# Patient Record
Sex: MALE | Race: WHITE | NOT HISPANIC OR LATINO
[De-identification: names, ages, dates, MRNs, and addresses within clinical notes are randomized per-mention and may not be internally consistent; named-entity substitution may affect disease eponyms.]

---

## 2017-06-30 ENCOUNTER — APPOINTMENT (OUTPATIENT)
Dept: UROLOGY | Facility: CLINIC | Age: 82
End: 2017-06-30

## 2017-06-30 VITALS
HEIGHT: 71 IN | HEART RATE: 62 BPM | SYSTOLIC BLOOD PRESSURE: 177 MMHG | WEIGHT: 200 LBS | BODY MASS INDEX: 28 KG/M2 | TEMPERATURE: 97.6 F | OXYGEN SATURATION: 97 % | DIASTOLIC BLOOD PRESSURE: 81 MMHG

## 2017-06-30 DIAGNOSIS — Z86.69 PERSONAL HISTORY OF OTHER DISEASES OF THE NERVOUS SYSTEM AND SENSE ORGANS: ICD-10-CM

## 2017-06-30 DIAGNOSIS — Z85.820 PERSONAL HISTORY OF MALIGNANT MELANOMA OF SKIN: ICD-10-CM

## 2017-06-30 RX ORDER — CIPROFLOXACIN HYDROCHLORIDE 500 MG/1
500 TABLET, FILM COATED ORAL TWICE DAILY
Qty: 20 | Refills: 0 | Status: ACTIVE | COMMUNITY
Start: 2017-06-30 | End: 1900-01-01

## 2017-07-01 PROBLEM — Z86.69 HISTORY OF MACULAR DEGENERATION: Status: RESOLVED | Noted: 2017-07-01 | Resolved: 2017-07-01

## 2017-07-01 PROBLEM — Z85.820 HISTORY OF MALIGNANT MELANOMA OF SKIN: Status: RESOLVED | Noted: 2017-07-01 | Resolved: 2017-07-01

## 2017-07-01 LAB
ANION GAP SERPL CALC-SCNC: 21 MMOL/L
APPEARANCE: ABNORMAL
BACTERIA: NEGATIVE
BILIRUBIN URINE: ABNORMAL
BLOOD URINE: ABNORMAL
BUN SERPL-MCNC: 19 MG/DL
CALCIUM SERPL-MCNC: 9.6 MG/DL
CHLORIDE SERPL-SCNC: 97 MMOL/L
CO2 SERPL-SCNC: 20 MMOL/L
COLOR: ABNORMAL
CREAT SERPL-MCNC: 1.43 MG/DL
GLUCOSE QUALITATIVE U: NORMAL
GLUCOSE SERPL-MCNC: 85 MG/DL
HYALINE CASTS: 6 /LPF
KETONES URINE: NEGATIVE
LEUKOCYTE ESTERASE URINE: ABNORMAL
MICROSCOPIC-UA: NORMAL
NITRITE URINE: POSITIVE
PH URINE: 6
POTASSIUM SERPL-SCNC: 4.3 MMOL/L
PROTEIN URINE: 100
PSA SERPL-MCNC: 2.32 NG/ML
RED BLOOD CELLS URINE: >720 /HPF
SODIUM SERPL-SCNC: 138 MMOL/L
SPECIFIC GRAVITY URINE: 1.02
SQUAMOUS EPITHELIAL CELLS: 0 /HPF
UROBILINOGEN URINE: NORMAL
WHITE BLOOD CELLS URINE: 44 /HPF

## 2017-07-03 ENCOUNTER — OUTPATIENT (OUTPATIENT)
Dept: OUTPATIENT SERVICES | Facility: HOSPITAL | Age: 82
LOS: 1 days | End: 2017-07-03
Payer: MEDICARE

## 2017-07-03 ENCOUNTER — APPOINTMENT (OUTPATIENT)
Dept: CT IMAGING | Facility: CLINIC | Age: 82
End: 2017-07-03

## 2017-07-03 DIAGNOSIS — Z00.8 ENCOUNTER FOR OTHER GENERAL EXAMINATION: ICD-10-CM

## 2017-07-03 PROCEDURE — 74178 CT ABD&PLV WO CNTR FLWD CNTR: CPT

## 2017-07-03 PROCEDURE — 82565 ASSAY OF CREATININE: CPT

## 2017-07-08 LAB
BACTERIA UR CULT: ABNORMAL
CORE LAB FLUID CYTOLOGY: NORMAL

## 2017-07-10 ENCOUNTER — APPOINTMENT (OUTPATIENT)
Dept: UROLOGY | Facility: CLINIC | Age: 82
End: 2017-07-10

## 2017-07-10 VITALS
WEIGHT: 197 LBS | TEMPERATURE: 98.4 F | HEIGHT: 71 IN | BODY MASS INDEX: 27.58 KG/M2 | SYSTOLIC BLOOD PRESSURE: 177 MMHG | RESPIRATION RATE: 17 BRPM | HEART RATE: 65 BPM | DIASTOLIC BLOOD PRESSURE: 67 MMHG

## 2017-07-10 DIAGNOSIS — R31.9 HEMATURIA, UNSPECIFIED: ICD-10-CM

## 2017-07-10 DIAGNOSIS — Z00.00 ENCOUNTER FOR GENERAL ADULT MEDICAL EXAMINATION W/OUT ABNORMAL FINDINGS: ICD-10-CM

## 2017-07-10 DIAGNOSIS — R93.421 ABNORMAL RADIOLOGIC FINDINGS ON DIAGNOSITIC IMAGING OF RIGHT KIDNEY: ICD-10-CM

## 2017-07-10 DIAGNOSIS — R93.41 ABNORMAL RADIOLOGIC FINDINGS ON DIAGNOSTIC IMAGING OF OF RENAL PELVIS, URETER, OR BLADDER: ICD-10-CM

## 2017-07-11 PROBLEM — R31.9 HEMATURIA: Status: ACTIVE | Noted: 2017-06-30

## 2017-07-11 PROBLEM — Z00.00 ENCOUNTER FOR PREVENTIVE HEALTH EXAMINATION: Status: ACTIVE | Noted: 2017-06-29

## 2017-07-11 PROBLEM — R93.421 ABNORMAL FINDING ON DIAGNOSTIC IMAGING OF RIGHT KIDNEY: Status: ACTIVE | Noted: 2017-07-11

## 2017-07-11 PROBLEM — R93.41 ABNORMAL FINDING ON DIAGNOSTIC IMAGING OF RENAL PELVIS, URETER, OR BLADDER: Status: ACTIVE | Noted: 2017-07-11

## 2017-07-11 LAB
APTT BLD: 33 SEC
BASOPHILS # BLD AUTO: 0.02 K/UL
BASOPHILS NFR BLD AUTO: 0.3 %
EOSINOPHIL # BLD AUTO: 0.12 K/UL
EOSINOPHIL NFR BLD AUTO: 1.7 %
HCT VFR BLD CALC: 35.9 %
HGB BLD-MCNC: 11.6 G/DL
IMM GRANULOCYTES NFR BLD AUTO: 0.3 %
INR PPP: 1 RATIO
LYMPHOCYTES # BLD AUTO: 0.71 K/UL
LYMPHOCYTES NFR BLD AUTO: 10.1 %
MAN DIFF?: NORMAL
MCHC RBC-ENTMCNC: 30 PG
MCHC RBC-ENTMCNC: 32.3 GM/DL
MCV RBC AUTO: 92.8 FL
MONOCYTES # BLD AUTO: 1.09 K/UL
MONOCYTES NFR BLD AUTO: 15.5 %
NEUTROPHILS # BLD AUTO: 5.07 K/UL
NEUTROPHILS NFR BLD AUTO: 72.1 %
PLATELET # BLD AUTO: 272 K/UL
PT BLD: 11.3 SEC
RBC # BLD: 3.87 M/UL
RBC # FLD: 14.3 %
WBC # FLD AUTO: 7.03 K/UL

## 2017-07-18 ENCOUNTER — FORM ENCOUNTER (OUTPATIENT)
Age: 82
End: 2017-07-18

## 2017-07-19 ENCOUNTER — APPOINTMENT (OUTPATIENT)
Age: 82
End: 2017-07-19

## 2017-07-19 ENCOUNTER — OUTPATIENT (OUTPATIENT)
Dept: OUTPATIENT SERVICES | Facility: HOSPITAL | Age: 82
LOS: 1 days | End: 2017-07-19
Payer: MEDICARE

## 2017-07-19 DIAGNOSIS — R93.421 ABNORMAL RADIOLOGIC FINDINGS ON DIAGNOSTIC IMAGING OF RIGHT KIDNEY: ICD-10-CM

## 2017-07-19 PROCEDURE — A9562: CPT

## 2017-07-19 PROCEDURE — 51702 INSERT TEMP BLADDER CATH: CPT

## 2017-07-19 PROCEDURE — 78707 K FLOW/FUNCT IMAGE W/O DRUG: CPT

## 2017-07-24 ENCOUNTER — RECORD ABSTRACTING (OUTPATIENT)
Age: 82
End: 2017-07-24

## 2017-08-04 RX ORDER — LOSARTAN POTASSIUM 100 MG/1
100 TABLET, FILM COATED ORAL
Refills: 0 | Status: ACTIVE | COMMUNITY

## 2017-08-05 ENCOUNTER — INPATIENT (INPATIENT)
Facility: HOSPITAL | Age: 82
LOS: 2 days | Discharge: HOME | DRG: 243 | End: 2017-08-08
Attending: STUDENT IN AN ORGANIZED HEALTH CARE EDUCATION/TRAINING PROGRAM | Admitting: STUDENT IN AN ORGANIZED HEALTH CARE EDUCATION/TRAINING PROGRAM
Payer: MEDICARE

## 2017-08-05 ENCOUNTER — EMERGENCY (EMERGENCY)
Facility: HOSPITAL | Age: 82
LOS: 1 days | Discharge: ANOTHER HOSPITAL | End: 2017-08-05
Attending: STUDENT IN AN ORGANIZED HEALTH CARE EDUCATION/TRAINING PROGRAM | Admitting: STUDENT IN AN ORGANIZED HEALTH CARE EDUCATION/TRAINING PROGRAM
Payer: MEDICARE

## 2017-08-05 VITALS
HEART RATE: 82 BPM | WEIGHT: 193.79 LBS | HEIGHT: 70 IN | SYSTOLIC BLOOD PRESSURE: 196 MMHG | DIASTOLIC BLOOD PRESSURE: 96 MMHG | RESPIRATION RATE: 20 BRPM | TEMPERATURE: 98 F | OXYGEN SATURATION: 99 %

## 2017-08-05 VITALS
OXYGEN SATURATION: 98 % | SYSTOLIC BLOOD PRESSURE: 182 MMHG | RESPIRATION RATE: 11 BRPM | WEIGHT: 190.04 LBS | DIASTOLIC BLOOD PRESSURE: 92 MMHG | TEMPERATURE: 98 F | HEART RATE: 75 BPM | HEIGHT: 69 IN

## 2017-08-05 VITALS
RESPIRATION RATE: 17 BRPM | OXYGEN SATURATION: 100 % | DIASTOLIC BLOOD PRESSURE: 86 MMHG | HEART RATE: 73 BPM | SYSTOLIC BLOOD PRESSURE: 144 MMHG

## 2017-08-05 DIAGNOSIS — C64.1 MALIGNANT NEOPLASM OF RIGHT KIDNEY, EXCEPT RENAL PELVIS: ICD-10-CM

## 2017-08-05 DIAGNOSIS — I49.9 CARDIAC ARRHYTHMIA, UNSPECIFIED: ICD-10-CM

## 2017-08-05 DIAGNOSIS — R55 SYNCOPE AND COLLAPSE: ICD-10-CM

## 2017-08-05 DIAGNOSIS — I10 ESSENTIAL (PRIMARY) HYPERTENSION: ICD-10-CM

## 2017-08-05 DIAGNOSIS — L98.9 DISORDER OF THE SKIN AND SUBCUTANEOUS TISSUE, UNSPECIFIED: ICD-10-CM

## 2017-08-05 DIAGNOSIS — S82.209A UNSPECIFIED FRACTURE OF SHAFT OF UNSPECIFIED TIBIA, INITIAL ENCOUNTER FOR CLOSED FRACTURE: ICD-10-CM

## 2017-08-05 DIAGNOSIS — Z98.890 OTHER SPECIFIED POSTPROCEDURAL STATES: Chronic | ICD-10-CM

## 2017-08-05 DIAGNOSIS — I46.9 CARDIAC ARREST, CAUSE UNSPECIFIED: ICD-10-CM

## 2017-08-05 LAB
ALBUMIN SERPL ELPH-MCNC: 3.2 G/DL — LOW (ref 3.3–5)
ALBUMIN SERPL ELPH-MCNC: 3.6 G/DL — SIGNIFICANT CHANGE UP (ref 3.3–5)
ALP SERPL-CCNC: 53 U/L — SIGNIFICANT CHANGE UP (ref 40–120)
ALP SERPL-CCNC: 56 U/L — SIGNIFICANT CHANGE UP (ref 30–120)
ALT FLD-CCNC: 9 U/L RC — LOW (ref 10–45)
ALT FLD-CCNC: <10 U/L DA — LOW (ref 10–60)
ANION GAP SERPL CALC-SCNC: 10 MMOL/L — SIGNIFICANT CHANGE UP (ref 5–17)
ANION GAP SERPL CALC-SCNC: 14 MMOL/L — SIGNIFICANT CHANGE UP (ref 5–17)
APPEARANCE UR: ABNORMAL
APTT BLD: 28.3 SEC — SIGNIFICANT CHANGE UP (ref 27.5–37.4)
AST SERPL-CCNC: 14 U/L — SIGNIFICANT CHANGE UP (ref 10–40)
AST SERPL-CCNC: 15 U/L — SIGNIFICANT CHANGE UP (ref 10–40)
BASOPHILS # BLD AUTO: 0.1 K/UL — SIGNIFICANT CHANGE UP (ref 0–0.2)
BASOPHILS # BLD AUTO: 0.1 K/UL — SIGNIFICANT CHANGE UP (ref 0–0.2)
BASOPHILS NFR BLD AUTO: 0.5 % — SIGNIFICANT CHANGE UP (ref 0–2)
BASOPHILS NFR BLD AUTO: 0.6 % — SIGNIFICANT CHANGE UP (ref 0–2)
BILIRUB SERPL-MCNC: 0.8 MG/DL — SIGNIFICANT CHANGE UP (ref 0.2–1.2)
BILIRUB SERPL-MCNC: 0.9 MG/DL — SIGNIFICANT CHANGE UP (ref 0.2–1.2)
BILIRUB UR-MCNC: NEGATIVE — SIGNIFICANT CHANGE UP
BLD GP AB SCN SERPL QL: NEGATIVE — SIGNIFICANT CHANGE UP
BUN SERPL-MCNC: 21 MG/DL — SIGNIFICANT CHANGE UP (ref 7–23)
BUN SERPL-MCNC: 23 MG/DL — SIGNIFICANT CHANGE UP (ref 7–23)
CALCIUM SERPL-MCNC: 8.5 MG/DL — SIGNIFICANT CHANGE UP (ref 8.4–10.5)
CALCIUM SERPL-MCNC: 8.8 MG/DL — SIGNIFICANT CHANGE UP (ref 8.4–10.5)
CHLORIDE SERPL-SCNC: 101 MMOL/L — SIGNIFICANT CHANGE UP (ref 96–108)
CHLORIDE SERPL-SCNC: 102 MMOL/L — SIGNIFICANT CHANGE UP (ref 96–108)
CHOLEST SERPL-MCNC: 150 MG/DL — SIGNIFICANT CHANGE UP (ref 10–199)
CK MB BLD-MCNC: 1.7 % — SIGNIFICANT CHANGE UP (ref 0–3.5)
CK MB BLD-MCNC: 3.2 % — SIGNIFICANT CHANGE UP (ref 0–3.5)
CK MB CFR SERPL CALC: 1.7 NG/ML — SIGNIFICANT CHANGE UP (ref 0–3.6)
CK MB CFR SERPL CALC: 2.9 NG/ML — SIGNIFICANT CHANGE UP (ref 0–6.7)
CK SERPL-CCNC: 101 U/L — SIGNIFICANT CHANGE UP (ref 39–308)
CK SERPL-CCNC: 90 U/L — SIGNIFICANT CHANGE UP (ref 30–200)
CO2 SERPL-SCNC: 23 MMOL/L — SIGNIFICANT CHANGE UP (ref 22–31)
CO2 SERPL-SCNC: 26 MMOL/L — SIGNIFICANT CHANGE UP (ref 22–31)
COLOR SPEC: ABNORMAL
CREAT SERPL-MCNC: 1.6 MG/DL — HIGH (ref 0.5–1.3)
CREAT SERPL-MCNC: 1.88 MG/DL — HIGH (ref 0.5–1.3)
DIFF PNL FLD: ABNORMAL
EOSINOPHIL # BLD AUTO: 0 K/UL — SIGNIFICANT CHANGE UP (ref 0–0.5)
EOSINOPHIL # BLD AUTO: 0 K/UL — SIGNIFICANT CHANGE UP (ref 0–0.5)
EOSINOPHIL NFR BLD AUTO: 0.2 % — SIGNIFICANT CHANGE UP (ref 0–6)
EOSINOPHIL NFR BLD AUTO: 0.5 % — SIGNIFICANT CHANGE UP (ref 0–6)
GAS PNL BLDV: SIGNIFICANT CHANGE UP
GLUCOSE SERPL-MCNC: 103 MG/DL — HIGH (ref 70–99)
GLUCOSE SERPL-MCNC: 123 MG/DL — HIGH (ref 70–99)
GLUCOSE UR QL: NEGATIVE MG/DL — SIGNIFICANT CHANGE UP
HBA1C BLD-MCNC: 5.3 % — SIGNIFICANT CHANGE UP (ref 4–5.6)
HCT VFR BLD CALC: 37 % — LOW (ref 39–50)
HCT VFR BLD CALC: 37.5 % — LOW (ref 39–50)
HDLC SERPL-MCNC: 55 MG/DL — SIGNIFICANT CHANGE UP (ref 40–125)
HGB BLD-MCNC: 11.7 G/DL — LOW (ref 13–17)
HGB BLD-MCNC: 11.8 G/DL — LOW (ref 13–17)
INR BLD: 1.08 RATIO — SIGNIFICANT CHANGE UP (ref 0.88–1.16)
KETONES UR-MCNC: ABNORMAL
LEUKOCYTE ESTERASE UR-ACNC: ABNORMAL
LIPID PNL WITH DIRECT LDL SERPL: 83 MG/DL — SIGNIFICANT CHANGE UP
LYMPHOCYTES # BLD AUTO: 0.6 K/UL — LOW (ref 1–3.3)
LYMPHOCYTES # BLD AUTO: 0.7 K/UL — LOW (ref 1–3.3)
LYMPHOCYTES # BLD AUTO: 6.1 % — LOW (ref 13–44)
LYMPHOCYTES # BLD AUTO: 6.8 % — LOW (ref 13–44)
MAGNESIUM SERPL-MCNC: 2.2 MG/DL — SIGNIFICANT CHANGE UP (ref 1.6–2.6)
MCHC RBC-ENTMCNC: 30.7 PG — SIGNIFICANT CHANGE UP (ref 27–34)
MCHC RBC-ENTMCNC: 31.1 PG — SIGNIFICANT CHANGE UP (ref 27–34)
MCHC RBC-ENTMCNC: 31.5 GM/DL — LOW (ref 32–36)
MCHC RBC-ENTMCNC: 31.7 GM/DL — LOW (ref 32–36)
MCV RBC AUTO: 96.7 FL — SIGNIFICANT CHANGE UP (ref 80–100)
MCV RBC AUTO: 98.7 FL — SIGNIFICANT CHANGE UP (ref 80–100)
MONOCYTES # BLD AUTO: 1.1 K/UL — HIGH (ref 0–0.9)
MONOCYTES # BLD AUTO: 1.1 K/UL — HIGH (ref 0–0.9)
MONOCYTES NFR BLD AUTO: 10.8 % — SIGNIFICANT CHANGE UP (ref 2–14)
MONOCYTES NFR BLD AUTO: 11.1 % — SIGNIFICANT CHANGE UP (ref 2–14)
NEUTROPHILS # BLD AUTO: 8 K/UL — HIGH (ref 1.8–7.4)
NEUTROPHILS # BLD AUTO: 8.2 K/UL — HIGH (ref 1.8–7.4)
NEUTROPHILS NFR BLD AUTO: 81.3 % — HIGH (ref 43–77)
NEUTROPHILS NFR BLD AUTO: 82 % — HIGH (ref 43–77)
NITRITE UR-MCNC: NEGATIVE — SIGNIFICANT CHANGE UP
PH UR: 7 — SIGNIFICANT CHANGE UP (ref 5–8)
PLATELET # BLD AUTO: 136 K/UL — LOW (ref 150–400)
PLATELET # BLD AUTO: 137 K/UL — LOW (ref 150–400)
POTASSIUM SERPL-MCNC: 3.9 MMOL/L — SIGNIFICANT CHANGE UP (ref 3.5–5.3)
POTASSIUM SERPL-MCNC: 4.1 MMOL/L — SIGNIFICANT CHANGE UP (ref 3.5–5.3)
POTASSIUM SERPL-SCNC: 3.9 MMOL/L — SIGNIFICANT CHANGE UP (ref 3.5–5.3)
POTASSIUM SERPL-SCNC: 4.1 MMOL/L — SIGNIFICANT CHANGE UP (ref 3.5–5.3)
PROT SERPL-MCNC: 6.3 G/DL — SIGNIFICANT CHANGE UP (ref 6–8.3)
PROT SERPL-MCNC: 6.4 G/DL — SIGNIFICANT CHANGE UP (ref 6–8.3)
PROT UR-MCNC: 100 MG/DL
PROTHROM AB SERPL-ACNC: 11.7 SEC — SIGNIFICANT CHANGE UP (ref 9.8–12.7)
RBC # BLD: 3.8 M/UL — LOW (ref 4.2–5.8)
RBC # BLD: 3.83 M/UL — LOW (ref 4.2–5.8)
RBC # FLD: 13.4 % — SIGNIFICANT CHANGE UP (ref 10.3–14.5)
RBC # FLD: 13.8 % — SIGNIFICANT CHANGE UP (ref 10.3–14.5)
RBC CASTS # UR COMP ASSIST: >50 /HPF (ref 0–4)
RH IG SCN BLD-IMP: POSITIVE — SIGNIFICANT CHANGE UP
SODIUM SERPL-SCNC: 138 MMOL/L — SIGNIFICANT CHANGE UP (ref 135–145)
SODIUM SERPL-SCNC: 138 MMOL/L — SIGNIFICANT CHANGE UP (ref 135–145)
SP GR SPEC: 1.01 — SIGNIFICANT CHANGE UP (ref 1.01–1.02)
TOTAL CHOLESTEROL/HDL RATIO MEASUREMENT: 2.7 RATIO — LOW (ref 3.4–9.6)
TRIGL SERPL-MCNC: 60 MG/DL — SIGNIFICANT CHANGE UP (ref 10–149)
TROPONIN I SERPL-MCNC: 0.01 NG/ML — LOW (ref 0.02–0.06)
TROPONIN T SERPL-MCNC: <0.01 NG/ML — SIGNIFICANT CHANGE UP (ref 0–0.06)
TSH SERPL-MCNC: 1.39 UIU/ML — SIGNIFICANT CHANGE UP (ref 0.27–4.2)
UROBILINOGEN FLD QL: NEGATIVE MG/DL — SIGNIFICANT CHANGE UP
WBC # BLD: 10.1 K/UL — SIGNIFICANT CHANGE UP (ref 3.8–10.5)
WBC # BLD: 9.8 K/UL — SIGNIFICANT CHANGE UP (ref 3.8–10.5)
WBC # FLD AUTO: 10.1 K/UL — SIGNIFICANT CHANGE UP (ref 3.8–10.5)
WBC # FLD AUTO: 9.8 K/UL — SIGNIFICANT CHANGE UP (ref 3.8–10.5)

## 2017-08-05 PROCEDURE — 73590 X-RAY EXAM OF LOWER LEG: CPT | Mod: 26,LT

## 2017-08-05 PROCEDURE — 85027 COMPLETE CBC AUTOMATED: CPT

## 2017-08-05 PROCEDURE — 70450 CT HEAD/BRAIN W/O DYE: CPT

## 2017-08-05 PROCEDURE — 96375 TX/PRO/DX INJ NEW DRUG ADDON: CPT

## 2017-08-05 PROCEDURE — 27786 TREATMENT OF ANKLE FRACTURE: CPT | Mod: LT

## 2017-08-05 PROCEDURE — 93010 ELECTROCARDIOGRAM REPORT: CPT

## 2017-08-05 PROCEDURE — 99291 CRITICAL CARE FIRST HOUR: CPT

## 2017-08-05 PROCEDURE — 80053 COMPREHEN METABOLIC PANEL: CPT

## 2017-08-05 PROCEDURE — 81001 URINALYSIS AUTO W/SCOPE: CPT

## 2017-08-05 PROCEDURE — 84484 ASSAY OF TROPONIN QUANT: CPT

## 2017-08-05 PROCEDURE — 99291 CRITICAL CARE FIRST HOUR: CPT | Mod: 25

## 2017-08-05 PROCEDURE — 99223 1ST HOSP IP/OBS HIGH 75: CPT | Mod: GC

## 2017-08-05 PROCEDURE — 82553 CREATINE MB FRACTION: CPT

## 2017-08-05 PROCEDURE — 71010: CPT | Mod: 26,77

## 2017-08-05 PROCEDURE — 70450 CT HEAD/BRAIN W/O DYE: CPT | Mod: 26

## 2017-08-05 PROCEDURE — 73610 X-RAY EXAM OF ANKLE: CPT | Mod: 26,77,LT

## 2017-08-05 PROCEDURE — 96376 TX/PRO/DX INJ SAME DRUG ADON: CPT

## 2017-08-05 PROCEDURE — 73610 X-RAY EXAM OF ANKLE: CPT

## 2017-08-05 PROCEDURE — 73610 X-RAY EXAM OF ANKLE: CPT | Mod: 26,LT

## 2017-08-05 PROCEDURE — 93306 TTE W/DOPPLER COMPLETE: CPT | Mod: 26

## 2017-08-05 PROCEDURE — 71010: CPT | Mod: 26,76

## 2017-08-05 PROCEDURE — 82550 ASSAY OF CK (CPK): CPT

## 2017-08-05 PROCEDURE — 99233 SBSQ HOSP IP/OBS HIGH 50: CPT

## 2017-08-05 PROCEDURE — 71045 X-RAY EXAM CHEST 1 VIEW: CPT

## 2017-08-05 PROCEDURE — 96374 THER/PROPH/DIAG INJ IV PUSH: CPT

## 2017-08-05 RX ORDER — SODIUM CHLORIDE 9 MG/ML
3 INJECTION INTRAMUSCULAR; INTRAVENOUS; SUBCUTANEOUS ONCE
Qty: 0 | Refills: 0 | Status: COMPLETED | OUTPATIENT
Start: 2017-08-05 | End: 2017-08-05

## 2017-08-05 RX ORDER — LOSARTAN POTASSIUM 100 MG/1
100 TABLET, FILM COATED ORAL DAILY
Qty: 0 | Refills: 0 | Status: DISCONTINUED | OUTPATIENT
Start: 2017-08-05 | End: 2017-08-08

## 2017-08-05 RX ORDER — MUPIROCIN 20 MG/G
1 OINTMENT TOPICAL DAILY
Qty: 0 | Refills: 0 | Status: DISCONTINUED | OUTPATIENT
Start: 2017-08-05 | End: 2017-08-08

## 2017-08-05 RX ORDER — LIDOCAINE HCL 20 MG/ML
100 VIAL (ML) INJECTION ONCE
Qty: 0 | Refills: 0 | Status: DISCONTINUED | OUTPATIENT
Start: 2017-08-05 | End: 2017-08-05

## 2017-08-05 RX ORDER — ACETAMINOPHEN 500 MG
650 TABLET ORAL ONCE
Qty: 0 | Refills: 0 | Status: COMPLETED | OUTPATIENT
Start: 2017-08-05 | End: 2017-08-05

## 2017-08-05 RX ORDER — HEPARIN SODIUM 5000 [USP'U]/ML
5000 INJECTION INTRAVENOUS; SUBCUTANEOUS EVERY 12 HOURS
Qty: 0 | Refills: 0 | Status: DISCONTINUED | OUTPATIENT
Start: 2017-08-05 | End: 2017-08-08

## 2017-08-05 RX ADMIN — LOSARTAN POTASSIUM 100 MILLIGRAM(S): 100 TABLET, FILM COATED ORAL at 13:15

## 2017-08-05 RX ADMIN — SODIUM CHLORIDE 3 MILLILITER(S): 9 INJECTION INTRAMUSCULAR; INTRAVENOUS; SUBCUTANEOUS at 07:18

## 2017-08-05 RX ADMIN — HEPARIN SODIUM 5000 UNIT(S): 5000 INJECTION INTRAVENOUS; SUBCUTANEOUS at 18:07

## 2017-08-05 RX ADMIN — MUPIROCIN 1 APPLICATION(S): 20 OINTMENT TOPICAL at 15:52

## 2017-08-05 NOTE — ED ADULT NURSE REASSESSMENT NOTE - NS ED NURSE REASSESS COMMENT FT1
0830-voiding blood tinged urine in urinal without difficulty    0900- Dr Turner-cardiology - in attendance  0915- Report given to [facility]- Candis MANRIQUEZ

## 2017-08-05 NOTE — ED PROVIDER NOTE - HEAD, MLM
Head is atraumatic. Head shape is symmetrical. Ecchymosis to right eyelid, no laceration or bleeding

## 2017-08-05 NOTE — PROGRESS NOTE ADULT - SUBJECTIVE AND OBJECTIVE BOX
Date of Admission:8/5/17    24H hour events: admitted with heart block. temporary wire placed without incident.       MEDICATIONS:  losartan 100 milliGRAM(s) Oral daily              mupirocin 2% Ointment 1 Application(s) Topical daily      REVIEW OF SYSTEMS:  General: no fatigue/malaise, weight loss/gain.  Skin: no rashes.  Ophthalmologic: no blurred vision, no loss of vision. 	  ENT: no sore throat, rhinorrhea, sinus congestion.  Respiratory: no SOB, cough or wheeze.  Gastrointestinal:  no N/V/D, no melena/hematemesis/hematochezia.  Genitourinary: no dysuria/hesitancy or hematuria.  Musculoskeletal: no myalgias or arthralgias.  Neurological: no changes in vision or hearing, no lightheadedness/dizziness, no syncope/near syncope	  Psychiatric: no unusual stress/anxiety.   Hematology/Lymphatics: no unusual bleeding, bruising and no lymphadenopathy.  Endocrine: no unusual thirst.   All others negative except as stated above and in HPI.    PHYSICAL EXAM:  T(C): 36.9 (08-05-17 @ 10:46), Max: 36.9 (08-05-17 @ 07:40)  HR: 74 (08-05-17 @ 12:46) (73 - 90)  BP: 174/100 (08-05-17 @ 12:46) (121/80 - 196/96)  RR: 19 (08-05-17 @ 12:46) (11 - 20)  SpO2: 97% (08-05-17 @ 12:46) (97% - 100%)  Wt(kg): --  I&O's Summary    05 Aug 2017 07:01  -  05 Aug 2017 14:37  --------------------------------------------------------  IN: 0 mL / OUT: 175 mL / NET: -175 mL        Appearance: Normal	  HEENT:   Normal oral mucosa, PERRL, EOMI	  Lymphatic: No lymphadenopathy  Cardiovascular: Normal S1 S2, No JVD, No murmurs, No edema  Respiratory: Lungs clear to auscultation	  Psychiatry: A & O x 3, Mood & affect appropriate  Gastrointestinal:  Soft, Non-tender, + BS	  Skin: No rashes, No ecchymoses, No cyanosis	  Neurologic: Non-focal  Extremities: Normal range of motion, No clubbing, cyanosis or edema  Vascular: Peripheral pulses palpable 2+ bilaterally        LABS:	 	    CBC Full  -  ( 05 Aug 2017 12:13 )  WBC Count : 10.1 K/uL  Hemoglobin : 11.7 g/dL  Hematocrit : 37.0 %  Platelet Count - Automated : 137 K/uL  Mean Cell Volume : 96.7 fl  Mean Cell Hemoglobin : 30.7 pg  Mean Cell Hemoglobin Concentration : 31.7 gm/dL  Auto Neutrophil # : 8.2 K/uL  Auto Lymphocyte # : 0.7 K/uL  Auto Monocyte # : 1.1 K/uL  Auto Eosinophil # : 0.0 K/uL  Auto Basophil # : 0.1 K/uL  Auto Neutrophil % : 81.3 %  Auto Lymphocyte % : 6.8 %  Auto Monocyte % : 11.1 %  Auto Eosinophil % : 0.2 %  Auto Basophil % : 0.5 %    08-05    138  |  101  |  21  ----------------------------<  103<H>  3.9   |  23  |  1.60<H>  08-05    138  |  102  |  23  ----------------------------<  123<H>  4.1   |  26  |  1.88<H>    Ca    8.8      05 Aug 2017 12:13  Ca    8.5      05 Aug 2017 07:08  Mg     2.2     08-05    TPro  6.3  /  Alb  3.6  /  TBili  0.8  /  DBili  x   /  AST  14  /  ALT  9<L>  /  AlkPhos  53  08-05  TPro  6.4  /  Alb  3.2<L>  /  TBili  0.9  /  DBili  x   /  AST  15  /  ALT  <10<L>  /  AlkPhos  56  08-05      proBNP:   Lipid Profile:   HgA1c:   TSH:       CARDIAC MARKERS:  Troponin I, Serum: .011 ng/mL (08-05 @ 07:08)      PREVIOUS DIAGNOSTIC TESTING:    [ ] Echocardiogram:  [ ]  Catheterization:  [ ] Stress Test:  	  	  ASSESSMENT/PLAN: 	  87 y/o male history of HTN, Cataract, Glaucoma, melanoma dx >20 years ago s/p multiple resections, Renal CA of unknown origin w/ hematuria. Transfer from [facility] for ventricular asystole x2 lasting 8-10 seconds and syncope. temporary wire placed.     given syncope and high grade AV block recommend pacemaker implantation. explained to family and patient that he would have to be full code for procedure. will obtain echo to assess EF. hold AV anabell agents.

## 2017-08-05 NOTE — CONSULT NOTE ADULT - SUBJECTIVE AND OBJECTIVE BOX
History of Present Illness: The patient is an 88 year old male with a history of HTN, renal cell carcinoma, melanoma who presents with syncope. History obtained from daughter. The patient 2 days ago was found after a fall by family members; regained consciousness shortly thereafter. That evening had another fall. No other complaints during the day. No palpitations, dizziness, chest pain. The following night, had another fall. Family brought patient to ED. While in ED, he had three episodes of syncope, all while patient was lying down (one which he was lying down getting a CT). Pauses captured during one episode.    Past Medical/Surgical History:  HTN, renal cell carcinoma, melanoma    Medications:  Losartan    Family History: Non-contributory family history of premature cardiovascular atherosclerotic disease    Social History: No tobacco, alcohol or drug use    Review of Systems:  General: No fevers, chills, weight loss or gain  Skin: No rashes, color changes  Cardiovascular: No chest pain, orthopnea  Respiratory: No shortness of breath, cough  Gastrointestinal: No nausea, abdominal pain  Genitourinary: No incontinence, pain with urination  Musculoskeletal: No pain, swelling, decreased range of motion  Neurological: No headache, weakness  Psychiatric: No depression, anxiety  Endocrine: No weight loss or gain, increased thirst  All other systems are comprehensively negative.    Physical Exam:  Vitals:        Vital Signs Last 24 Hrs  T(C): 36.9 (05 Aug 2017 07:40), Max: 36.9 (05 Aug 2017 07:40)  T(F): 98.5 (05 Aug 2017 07:40), Max: 98.5 (05 Aug 2017 07:40)  HR: 74 (05 Aug 2017 08:38) (74 - 80)  BP: 121/80 (05 Aug 2017 08:38) (121/80 - 188/91)  BP(mean): --  RR: 19 (05 Aug 2017 08:38) (11 - 19)  SpO2: 100% (05 Aug 2017 08:38) (98% - 100%)  General: NAD  Neck: No JVD, no carotid bruit  Lungs: CTAB  CV: RRR, nl S1/S2, no M/R/G  Abdomen: S/NT/ND, +BS  Extremities: No LE edema, no cyanosis    Labs:                        11.8   9.8   )-----------( 136      ( 05 Aug 2017 07:08 )             37.5     08-05    138  |  102  |  23  ----------------------------<  123<H>  4.1   |  26  |  1.88<H>    Ca    8.5      05 Aug 2017 07:08    TPro  6.4  /  Alb  3.2<L>  /  TBili  0.9  /  DBili  x   /  AST  15  /  ALT  <10<L>  /  AlkPhos  56  08-05    CARDIAC MARKERS ( 05 Aug 2017 07:08 )  .011 ng/mL / x     / 101 U/L / x     / 1.7 ng/mL          ECG: NSR, LAFB, 1st deg AVB, PAC

## 2017-08-05 NOTE — H&P ADULT - NSHPPHYSICALEXAM_GEN_ALL_CORE
Gen: awake, alert  HENT: neck soft / supple  Lymph: no LAD noted in neck  Eye: PERRL, sclerae anicteric  CV: normal rate, regular rhythm  Pulm: CTAB, no w/r/r auscultated  Abd: +BS, soft, NT, ND  Skin: warm, dry, +fresh resection of ?lesion on right arm and right thigh. wound is dry w/ clean boarders, no erythema.   Ext: no LE edema  Neuro: answering questions appropriately, following commands appropriately, recent and remote memory intact  Psych: normal mood / affect Gen: awake, alert  HENT: neck soft / supple  Lymph: no LAD noted in neck  Eye: PERRL, sclerae anicteric  CV: normal rate, regular rhythm +systolic murmur  Pulm: CTAB, no w/r/r auscultated  Abd: +BS, soft, NT, ND  Skin: warm, dry, +fresh resection of ?lesion on right arm and right thigh. wound is dry w/ clean boarders, no erythema.   Ext: no LE edema  Neuro: answering questions appropriately, following commands appropriately, recent and remote memory intact  Psych: normal mood / affect Gen: awake, alert  HENT: neck soft / supple, ecchymosis and swelling of right upper eyelid.   Lymph: no LAD noted in neck  Eye: PERRL, sclerae anicteric  CV: normal rate, regular rhythm +systolic murmur  Pulm: CTAB, no w/r/r auscultated  Abd: +BS, soft, NT, ND  Skin: warm, dry, +fresh resection of ?lesion on right arm and right thigh. wound is dry w/ clean boarders, no erythema.   Ext: no LE edema  Neuro: answering questions appropriately, following commands appropriately, recent and remote memory intact  Psych: normal mood / affect

## 2017-08-05 NOTE — ED PROVIDER NOTE - MUSCULOSKELETAL, MLM
Spine appears normal, range of motion is not limited, left ankle swelling, diffuse ecchymosis to dorsum of left foot. Full ROM of left ankle, warm, cap refill< 2 sec, +DP pulse

## 2017-08-05 NOTE — H&P ADULT - PROBLEM SELECTOR PLAN 2
-can resume home losartan 100mg OD  -monitor Cr and BUN q24   -monitor lytes, replenish PRN  -BP check per CCU routine. likely in setting of ventricular asystole  -CT head at [place] negative for acute hemorrhage or infarct

## 2017-08-05 NOTE — CONSULT NOTE ADULT - ASSESSMENT
The patient is an 88 year old male with a history of HTN, renal cell carcinoma, melanoma who presents with multiple episodes of syncope, correlating to paroxysmal episodes of complete heart block.    Plan:  - Transcutaneous pacing placed and capturing  - Avoid AV anabell blocking agents  - Plan for transfer to [facility] for EP evaluation and pacemaker placement

## 2017-08-05 NOTE — H&P ADULT - HISTORY OF PRESENT ILLNESS
89 y/o male history of HTN, Cataract, Glaucoma, melanoma dx >20 years ago s/p multiple resections, Renal CA of unknown origin w/ hematuria. Transfer from [facility] for ventricular asystole x2 lasting 8-10 seconds each. On thursday AM patient had 1 unwitnessed syncopal episode. He states that at the time he felt light headed and passed out, no reports of vertigo, loss of bowel or bladder function, or post ictal state, also denies any numbness/tingling, slurred speech or weakness. Pt did not seek medical attention at that time. In the PM of the same day patient once again had a similar syncopal episode falling and hurting his ankle. 3rd episode of syncope occured this AM in which daughter convinced him to seek medical attention. As per the daughter, the patient did not seem to be feeling well over the past few days. States he has had decrease in appetite, and seemed to not be himself in-terms of social interactions.  Denies chest pain, SOB, nausea, vomiting, headaches, 87 y/o male history of HTN, Cataract, Glaucoma, melanoma dx >20 years ago s/p multiple resections, Renal CA of unknown origin w/ hematuria. Transfer from [facility] for ventricular asystole x2 lasting 8-10 seconds each during radiographic imaging. On thursday AM patient had 1 unwitnessed syncopal episode. He states that at the time he felt light headed and passed out, no reports of vertigo, loss of bowel or bladder function, or post ictal state, also denies any numbness/tingling, slurred speech or weakness. Pt did not seek medical attention at that time. In the PM of the same day patient once again had a similar syncopal episode falling and hurting his ankle. 3rd episode of syncope occured this AM in which daughter convinced him to seek medical attention. As per the daughter, the patient did not seem to be feeling well over the past few days. States he has had decrease in appetite, and seemed to not be himself in-terms of social interactions.  Denies chest pain, SOB, nausea, vomiting, headaches,     Patient was fully functional and living by self in [place] 2 months prior. Moved in w/ daughter 2/2 weakness and persistent hematuria.

## 2017-08-05 NOTE — ED PROVIDER NOTE - PROGRESS NOTE DETAILS
Patient had 2 syncopal episode while in ER lasting a couple of seconds monitor shows an episode of asystole 8-10 seconds. Patient had 2 syncopal episode while in ER lasting a couple of seconds monitor shows an episode of asystole 8-10 seconds. Called [facility] for transfer got hold of Dr Ayala will make decision and call as back. Tried to get in touch with Dr Mckoy, no answer Dr Sagastume answer suggest to call another cardiologist.

## 2017-08-05 NOTE — H&P ADULT - ASSESSMENT
88 M hx of HTN, cataract, glaucoma, melanoma, renal CA w/ hematuria. Presented w/ syncope and found to have  episodes of ventricular asystole s/p urgent PPD placement and nondisplaced fracture of distal fibula.

## 2017-08-05 NOTE — H&P ADULT - PROBLEM SELECTOR PLAN 4
X-ray of left ankle + for non-displaced fracture of distal fibula.   -will call orthopedics. as per family, patient has discussed w/ oncologist and pt does not want any further workup and or treatment.

## 2017-08-05 NOTE — CONSULT NOTE ADULT - SUBJECTIVE AND OBJECTIVE BOX
Patient is a 88 year old male presents with left ankle pain status post twisting injury. Patient denies numbness, tingling, or paresthesias in affected limb. Patient denies headstrike or loss of consciousness and denies any other orthopedic injuries at this time. Patient is admitted for pacemaker placement, a transfer from [facility].     PAST MEDICAL & SURGICAL HISTORY:  Macular degeneration, right eye  Hypertension  Melanoma in situ of right upper arm  Melanoma in situ of lower leg, right  Renal cancer, right  H/O inguinal hernia repair      Allergies    No Known Allergies    Intolerances        Imaging: Xrays of the left ankle demonstrates a non-displaced fibular fx.     Physical Exam:  General: Not in acute distress  Left Lower Extremity: Skin intact. Tenderness to palpation of the lateral malleolus. No tenderness to palpation of the Hip/Knee/Foot/Toes. Extensor hallucis longus, Flexor hallucis longus, Tibialis anterior, and Gastrocnemius/Soleus complex intact. Sensation intact to light touch in L2-S1 nerve distribution. Dorsalis Pedis and Posterior Tibialis pulses are 2+. Compartments are soft and compressible. No tenderness to palpation of calves.     Secondary Survey: Full range of motion of unaffected extremities, sensation intact to light touch globally, compartments soft, no bony tenderness to palpation over bony prominences, no calf tenderness to palpation, no tenderness to palpation along axial spine.       A/P: 88 year old male with left zendejas B ankle fracture    -Analgesia  -Affected ankle Non-weight bearing in short leg cast, crutches for ambulation  -Keep cast clean dry and intact  -No acute orthopedic intervention necessary at this time  -Follow up with Dr. Darren Del Rio in 1 week from discharge date  -Orthopedically stable for discharge

## 2017-08-05 NOTE — H&P ADULT - PROBLEM SELECTOR PLAN 5
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   -keep wound clean/dry, mupirocin topical. X-ray of left ankle + for non-displaced fracture of distal fibula.   -will call orthopedics.

## 2017-08-05 NOTE — ED ADULT NURSE NOTE - OBJECTIVE STATEMENT
Pt states he has been fainting in the past few days, 3 episodes. Pt had an episode after using the bathroom another episode while getting out of bed. Pt is now awake, alert and conversant with clear coherent speech. Pt reports feeling some weakness recently also.

## 2017-08-05 NOTE — H&P ADULT - PROBLEM SELECTOR PLAN 1
s/p PPD placement.  -continuous telemetry monitoring,   - q24 cbc, bmp, mg and phos.  -replete lytes as needed.

## 2017-08-05 NOTE — ED PROVIDER NOTE - OBJECTIVE STATEMENT
88 year male with a history of HTN, renal CA presents with syncope x 3 in 24 hours. He states prior to falling, he feel dizzy and weak. Family hears a loud noise and find patient awake on the floor, unable to recollect the fall. Denies chest pain, n/v/d, GI bleed. Patient was living in [place] until 2 months ago when he moved in with his daughter secondary to weakness and persistent hematuria. Once in [place], he saw a urologist Dr. Yan who diagnosed patient with right-sided renal cancer. Patient opted to not treat his CA.  In addition, he has not taken his Cozaar for several weeks. Denies any pain. Family noted swelling to left ankle and ecchymosis to right eyelid. Daughter was in touch with Dr. Ahmadi who recommended patient come to ED for further evaluation. Patient does not have PMD in [place].

## 2017-08-05 NOTE — CHART NOTE - NSCHARTNOTEFT_GEN_A_CORE
====================  NEW EVENTS:  ====================  Patient admitted. Transvenous pacemaker placed. Orthopedics evaluated patient for left ankle fracture, no operative intervention.    ====================  SUMMARY:  ====================  HPI:  89 y/o male history of HTN, Cataract, Glaucoma, melanoma dx >20 years ago s/p multiple resections, Renal CA of unknown origin w/ hematuria. Transfer from [facility] for ventricular asystole x2 lasting 8-10 seconds each during radiographic imaging. On thursday AM patient had 1 unwitnessed syncopal episode. He states that at the time he felt light headed and passed out, no reports of vertigo, loss of bowel or bladder function, or post ictal state, also denies any numbness/tingling, slurred speech or weakness. Pt did not seek medical attention at that time. In the PM of the same day patient once again had a similar syncopal episode falling and hurting his ankle. 3rd episode of syncope occured this AM in which daughter convinced him to seek medical attention. As per the daughter, the patient did not seem to be feeling well over the past few days. States he has had decrease in appetite, and seemed to not be himself in-terms of social interactions.  Denies chest pain, SOB, nausea, vomiting, headaches,     Patient was fully functional and living by self in [place] 2 months prior. Moved in w/ daughter 2/2 weakness and persistent hematuria. (05 Aug 2017 11:34)      ====================  VITALS:  ====================    ICU Vital Signs Last 24 Hrs  T(C): 36.4 (05 Aug 2017 19:45), Max: 36.9 (05 Aug 2017 07:40)  T(F): 97.5 (05 Aug 2017 19:45), Max: 98.5 (05 Aug 2017 07:40)  HR: 54 (05 Aug 2017 21:48) (54 - 90)  BP: 145/66 (05 Aug 2017 21:48) (121/80 - 196/96)  BP(mean): 102 (05 Aug 2017 21:48) (93 - 146)  ABP: --  ABP(mean): --  RR: 26 (05 Aug 2017 21:48) (11 - 35)  SpO2: 96% (05 Aug 2017 21:48) (96% - 100%)      I&O's Summary    05 Aug 2017 07:01  -  05 Aug 2017 22:26  --------------------------------------------------------  IN: 15 mL / OUT: 625 mL / NET: -610 mL        Adult Advanced Hemodynamics Last 24 Hrs  CVP(mm Hg): --  CVP(cm H2O): --  CO: --  CI: --  PA: --  PA(mean): --  PCWP: --  SVR: --  SVRI: --  PVR: --  PVRI: --        ====================  LABS:  ====================                          11.7   10.1  )-----------( 137      ( 05 Aug 2017 12:13 )             37.0     08-05    138  |  101  |  21  ----------------------------<  103<H>  3.9   |  23  |  1.60<H>    Ca    8.8      05 Aug 2017 12:13  Mg     2.2     08-05    TPro  6.3  /  Alb  3.6  /  TBili  0.8  /  DBili  x   /  AST  14  /  ALT  9<L>  /  AlkPhos  53  08-05    PT/INR - ( 05 Aug 2017 12:13 )   PT: 11.7 sec;   INR: 1.08 ratio         PTT - ( 05 Aug 2017 12:13 )  PTT:28.3 sec  Creatine Kinase, Serum: 90 U/L (08-05-17 @ 12:13)  Troponin T, Serum: <0.01 ng/mL (08-05-17 @ 12:13)  Creatine Kinase, Serum: 101 U/L (08-05-17 @ 07:08)        ====================  PLAN:  ====================  -Patient will need PPM  -Hold AVN blocking agents  -Orthopedics follow up after discharge

## 2017-08-05 NOTE — H&P ADULT - NSHPREVIEWOFSYSTEMS_GEN_ALL_CORE
REVIEW OF SYSTEMS:    CONSTITUTIONAL: No weakness, fevers or chills  EYES/ENT: No visual changes;  No vertigo or throat pain   NECK: No pain or stiffness  RESPIRATORY: No cough, wheezing, hemoptysis; No shortness of breath  CARDIOVASCULAR: No chest pain or palpitations  GASTROINTESTINAL: No abdominal or epigastric pain. No nausea, vomiting, or hematemesis; No diarrhea or constipation. No melena or hematochezia.  GENITOURINARY: No dysuria, frequency or hematuria  NEUROLOGICAL: No numbness or weakness  SKIN: No itching, burning, rashes, s/p excision of "squamous cell" on right arm and calk  All other review of systems is negative unless indicated above.

## 2017-08-05 NOTE — H&P ADULT - PROBLEM SELECTOR PLAN 6
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   -keep wound clean/dry, mupirocin topical.

## 2017-08-05 NOTE — ED ADULT NURSE REASSESSMENT NOTE - NS ED NURSE REASSESS COMMENT FT1
0726- Pt was in Radiology for CT scan & xrays when ED staff called for a Rapid Response. Radiology staff witnessed pt 's eyes roll back with upper body tremors & few seconds of unresponsiveness. Pt now alert & oriented. Color fair. Skin warm & dry to touch. Lungs clear. No incontinence. MULLINS freely. Dr Dillon in attendance. Pt returned to ED. Initially CM-RSR then had long pause-Asystole. External Pacer bedside- pads on pt.  Call placed to Dr Mckoy- cardiology.

## 2017-08-05 NOTE — H&P ADULT - PROBLEM SELECTOR PLAN 3
as per family, patient has discussed w/ oncologist and pt does not want any further workup and or treatment. -can resume home losartan 100mg OD  -monitor Cr and BUN q24   -monitor lytes, replenish PRN  -BP check per CCU routine.

## 2017-08-05 NOTE — ED PROVIDER NOTE - PMH
Hypertension    Macular degeneration, right eye    Melanoma in situ of lower leg, right    Melanoma in situ of right upper arm    Renal cancer, right

## 2017-08-06 DIAGNOSIS — I44.2 ATRIOVENTRICULAR BLOCK, COMPLETE: ICD-10-CM

## 2017-08-06 LAB
ALBUMIN SERPL ELPH-MCNC: 3.3 G/DL — SIGNIFICANT CHANGE UP (ref 3.3–5)
ALP SERPL-CCNC: 48 U/L — SIGNIFICANT CHANGE UP (ref 40–120)
ALT FLD-CCNC: 9 U/L RC — LOW (ref 10–45)
ANION GAP SERPL CALC-SCNC: 11 MMOL/L — SIGNIFICANT CHANGE UP (ref 5–17)
APTT BLD: 29.8 SEC — SIGNIFICANT CHANGE UP (ref 27.5–37.4)
AST SERPL-CCNC: 13 U/L — SIGNIFICANT CHANGE UP (ref 10–40)
BASOPHILS # BLD AUTO: 0 K/UL — SIGNIFICANT CHANGE UP (ref 0–0.2)
BASOPHILS NFR BLD AUTO: 0.2 % — SIGNIFICANT CHANGE UP (ref 0–2)
BILIRUB SERPL-MCNC: 0.7 MG/DL — SIGNIFICANT CHANGE UP (ref 0.2–1.2)
BUN SERPL-MCNC: 24 MG/DL — HIGH (ref 7–23)
CALCIUM SERPL-MCNC: 8.4 MG/DL — SIGNIFICANT CHANGE UP (ref 8.4–10.5)
CHLORIDE SERPL-SCNC: 102 MMOL/L — SIGNIFICANT CHANGE UP (ref 96–108)
CO2 SERPL-SCNC: 25 MMOL/L — SIGNIFICANT CHANGE UP (ref 22–31)
CREAT SERPL-MCNC: 1.67 MG/DL — HIGH (ref 0.5–1.3)
EOSINOPHIL # BLD AUTO: 0.2 K/UL — SIGNIFICANT CHANGE UP (ref 0–0.5)
EOSINOPHIL NFR BLD AUTO: 2.2 % — SIGNIFICANT CHANGE UP (ref 0–6)
GLUCOSE SERPL-MCNC: 93 MG/DL — SIGNIFICANT CHANGE UP (ref 70–99)
HCT VFR BLD CALC: 33.2 % — LOW (ref 39–50)
HGB BLD-MCNC: 11.1 G/DL — LOW (ref 13–17)
INR BLD: 1.07 RATIO — SIGNIFICANT CHANGE UP (ref 0.88–1.16)
LYMPHOCYTES # BLD AUTO: 1.2 K/UL — SIGNIFICANT CHANGE UP (ref 1–3.3)
LYMPHOCYTES # BLD AUTO: 14 % — SIGNIFICANT CHANGE UP (ref 13–44)
MAGNESIUM SERPL-MCNC: 2.2 MG/DL — SIGNIFICANT CHANGE UP (ref 1.6–2.6)
MCHC RBC-ENTMCNC: 32.7 PG — SIGNIFICANT CHANGE UP (ref 27–34)
MCHC RBC-ENTMCNC: 33.5 GM/DL — SIGNIFICANT CHANGE UP (ref 32–36)
MCV RBC AUTO: 97.4 FL — SIGNIFICANT CHANGE UP (ref 80–100)
MONOCYTES # BLD AUTO: 1.2 K/UL — HIGH (ref 0–0.9)
MONOCYTES NFR BLD AUTO: 13.9 % — SIGNIFICANT CHANGE UP (ref 2–14)
NEUTROPHILS # BLD AUTO: 5.8 K/UL — SIGNIFICANT CHANGE UP (ref 1.8–7.4)
NEUTROPHILS NFR BLD AUTO: 69.6 % — SIGNIFICANT CHANGE UP (ref 43–77)
PLATELET # BLD AUTO: 125 K/UL — LOW (ref 150–400)
POTASSIUM SERPL-MCNC: 4 MMOL/L — SIGNIFICANT CHANGE UP (ref 3.5–5.3)
POTASSIUM SERPL-SCNC: 4 MMOL/L — SIGNIFICANT CHANGE UP (ref 3.5–5.3)
PROT SERPL-MCNC: 5.9 G/DL — LOW (ref 6–8.3)
PROTHROM AB SERPL-ACNC: 11.6 SEC — SIGNIFICANT CHANGE UP (ref 9.8–12.7)
RBC # BLD: 3.41 M/UL — LOW (ref 4.2–5.8)
RBC # FLD: 13.5 % — SIGNIFICANT CHANGE UP (ref 10.3–14.5)
SODIUM SERPL-SCNC: 138 MMOL/L — SIGNIFICANT CHANGE UP (ref 135–145)
WBC # BLD: 8.4 K/UL — SIGNIFICANT CHANGE UP (ref 3.8–10.5)
WBC # FLD AUTO: 8.4 K/UL — SIGNIFICANT CHANGE UP (ref 3.8–10.5)

## 2017-08-06 PROCEDURE — 99233 SBSQ HOSP IP/OBS HIGH 50: CPT | Mod: GC

## 2017-08-06 PROCEDURE — 33208 INSRT HEART PM ATRIAL & VENT: CPT

## 2017-08-06 PROCEDURE — 71010: CPT | Mod: 26

## 2017-08-06 PROCEDURE — 93010 ELECTROCARDIOGRAM REPORT: CPT

## 2017-08-06 RX ORDER — HYDROCHLOROTHIAZIDE 25 MG
37.5 TABLET ORAL DAILY
Qty: 0 | Refills: 0 | Status: DISCONTINUED | OUTPATIENT
Start: 2017-08-06 | End: 2017-08-08

## 2017-08-06 RX ORDER — CEFAZOLIN SODIUM 1 G
1000 VIAL (EA) INJECTION EVERY 8 HOURS
Qty: 0 | Refills: 0 | Status: COMPLETED | OUTPATIENT
Start: 2017-08-06 | End: 2017-08-07

## 2017-08-06 RX ORDER — METOPROLOL TARTRATE 50 MG
25 TABLET ORAL
Qty: 0 | Refills: 0 | Status: DISCONTINUED | OUTPATIENT
Start: 2017-08-06 | End: 2017-08-07

## 2017-08-06 RX ORDER — CEFAZOLIN SODIUM 1 G
2000 VIAL (EA) INJECTION ONCE
Qty: 0 | Refills: 0 | Status: COMPLETED | OUTPATIENT
Start: 2017-08-06 | End: 2017-08-06

## 2017-08-06 RX ORDER — HYDRALAZINE HCL 50 MG
10 TABLET ORAL ONCE
Qty: 0 | Refills: 0 | Status: COMPLETED | OUTPATIENT
Start: 2017-08-06 | End: 2017-08-06

## 2017-08-06 RX ORDER — CHLORHEXIDINE GLUCONATE 213 G/1000ML
1 SOLUTION TOPICAL ONCE
Qty: 0 | Refills: 0 | Status: COMPLETED | OUTPATIENT
Start: 2017-08-06 | End: 2017-08-06

## 2017-08-06 RX ORDER — HYDROCHLOROTHIAZIDE 25 MG
25 TABLET ORAL DAILY
Qty: 0 | Refills: 0 | Status: DISCONTINUED | OUTPATIENT
Start: 2017-08-06 | End: 2017-08-06

## 2017-08-06 RX ADMIN — Medication 10 MILLIGRAM(S): at 23:18

## 2017-08-06 RX ADMIN — Medication 25 MILLIGRAM(S): at 12:00

## 2017-08-06 RX ADMIN — Medication 25 MILLIGRAM(S): at 18:31

## 2017-08-06 RX ADMIN — CHLORHEXIDINE GLUCONATE 1 APPLICATION(S): 213 SOLUTION TOPICAL at 06:12

## 2017-08-06 RX ADMIN — LOSARTAN POTASSIUM 100 MILLIGRAM(S): 100 TABLET, FILM COATED ORAL at 12:00

## 2017-08-06 RX ADMIN — Medication 10 MILLIGRAM(S): at 21:33

## 2017-08-06 RX ADMIN — MUPIROCIN 1 APPLICATION(S): 20 OINTMENT TOPICAL at 16:03

## 2017-08-06 RX ADMIN — HEPARIN SODIUM 5000 UNIT(S): 5000 INJECTION INTRAVENOUS; SUBCUTANEOUS at 18:30

## 2017-08-06 RX ADMIN — Medication 100 MILLIGRAM(S): at 23:00

## 2017-08-06 RX ADMIN — Medication 100 MILLIGRAM(S): at 16:03

## 2017-08-06 NOTE — PROGRESS NOTE ADULT - ASSESSMENT
this is an 88 year old man with past medical history of HTN, Cataract, Glaucoma, melanoma dx >20 years ago s/p multiple resections, Renal CA of unknown origin w/ hematuria. Transfer from [facility] for ventricular asystole x2 lasting 8-10 seconds each during radiographic imaging. Patient had several syncopal episodes in past few days bot witnessed and unwitnessed. He states that at the time he felt light headed and passed out, no reports of vertigo, loss of bowel or bladder function, or post ictal state, also denies any numbness/tingling, slurred speech or weakness.  Transvenous Pacing wires placed and patient is for PPM. this is an 88 year old man with past medical history of HTN, Cataract, Glaucoma, melanoma dx >20 years ago s/p multiple resections, Renal CA of unknown origin w/ hematuria. Transfer from [facility] for ventricular asystole x2 lasting 8-10 seconds each during radiographic imaging. Patient had several syncopal episodes in past few days bot witnessed and unwitnessed. He states that at the time he felt light headed and passed out, no reports of vertigo, loss of bowel or bladder function, or post ictal state, also denies any numbness/tingling, slurred speech or weakness.  Transvenous Pacing wires placed and patient is for PPM. Now s/p PPM

## 2017-08-06 NOTE — PROGRESS NOTE ADULT - SUBJECTIVE AND OBJECTIVE BOX
CHIEF COMPLAINT::    INTERVAL HISTORY:    REVIEW OF SYSTEMS: all others negative    MEDICATIONS  (STANDING):  losartan 100 milliGRAM(s) Oral daily  mupirocin 2% Ointment 1 Application(s) Topical daily  heparin  Injectable 5000 Unit(s) SubCutaneous every 12 hours  ceFAZolin   IVPB 2000 milliGRAM(s) IV Intermittent once    MEDICATIONS  (PRN):      Objective:  Vital Signs Last 24 Hrs  T(C): 36.8 (06 Aug 2017 03:00), Max: 36.9 (05 Aug 2017 10:46)  T(F): 98.3 (06 Aug 2017 03:00), Max: 98.4 (05 Aug 2017 10:46)  HR: 70 (06 Aug 2017 07:00) (52 - 90)  BP: 180/80 (06 Aug 2017 07:00) (121/80 - 196/96)  BP(mean): 108 (06 Aug 2017 07:00) (89 - 146)  RR: 20 (06 Aug 2017 07:00) (15 - 35)  SpO2: 98% (06 Aug 2017 07:) (96% - 100%)  ICU Vital Signs Last 24 Hrs  T(C): 36.8 (06 Aug 2017 03:00), Max: 36.9 (05 Aug 2017 10:46)  T(F): 98.3 (06 Aug 2017 03:00), Max: 98.4 (05 Aug 2017 10:46)  HR: 70 (06 Aug 2017 07:00) (52 - 90)  BP: 180/80 (06 Aug 2017 07:00) (121/80 - 196/96)  BP(mean): 108 (06 Aug 2017 07:00) (89 - 146)  ABP: --  ABP(mean): --  RR: 20 (06 Aug 2017 07:00) (15 - 35)  SpO2: 98% (06 Aug 2017 07:00) (96% - 100%)      Adult Advanced Hemodynamics Last 24 Hrs  CVP(mm Hg): --  CVP(cm H2O): --  CO: --  CI: --  PA: --  PA(mean): --  PCWP: --  SVR: --  SVRI: --  PVR: --  PVRI: --      08-05 @ 07:01  -  08-06 @ 07:00  --------------------------------------------------------  IN: 180 mL / OUT: 975 mL / NET: -795 mL      Daily Height in cm: 177.8 (05 Aug 2017 10:46)    Daily Weight in k.5 (06 Aug 2017 06:00)    PHYSICAL EXAM:      Constitutional:    Eyes:    ENMT:    Neck:    Breasts:    Back:    Respiratory:    Cardiovascular:    Gastrointestinal:    Genitourinary:    Rectal:    Extremities:    Vascular:    Neurological:    Skin:    Lymph Nodes:    Musculoskeletal:    Psychiatric:        TELEMETRY:     EKG:     CARDIAC CATH:     ECHO:    IMAGIN.1   8.4   )-----------( 125      ( 06 Aug 2017 04:23 )             33.2     08-    138  |  102  |  24<H>  ----------------------------<  93  4.0   |  25  |  1.67<H>    Ca    8.4      06 Aug 2017 04:23  Mg     2.2         TPro  5.9<L>  /  Alb  3.3  /  TBili  0.7  /  DBili  x   /  AST  13  /  ALT  9<L>  /  AlkPhos  48  08    LIVER FUNCTIONS - ( 06 Aug 2017 04:23 )  Alb: 3.3 g/dL / Pro: 5.9 g/dL / ALK PHOS: 48 U/L / ALT: 9 U/L RC / AST: 13 U/L / GGT: x           PT/INR - ( 06 Aug 2017 04:23 )   PT: 11.6 sec;   INR: 1.07 ratio         PTT - ( 06 Aug 2017 04:23 )  PTT:29.8 sec  CKMB Units: 2.9 ng/mL ( @ 12:13)  Creatine Kinase, Serum: 90 U/L ( @ 12:13)  Troponin T, Serum: <0.01 ng/mL ( @ 12:13)      *BLOOD GAS/ARTERIAL/MIXED/VENOUS  *LACTATE  Urinalysis Basic - ( 05 Aug 2017 08:23 )    Color: Red / Appearance: x / S.010 / pH: x  Gluc: x / Ketone: Small  / Bili: Negative / Urobili: Negative mg/dL   Blood: x / Protein: 100 mg/dL / Nitrite: Negative   Leuk Esterase: Trace / RBC: >50 /HPF / WBC x   Sq Epi: x / Non Sq Epi: x / Bacteria: x        HEALTH ISSUES - PROBLEM Dx:  Syncope: Syncope  Skin lesion: Skin lesion  Fracture tibia/fibula: Fracture tibia/fibula  Renal cancer, right: Renal cancer, right  Hypertension: Hypertension  Ventricular asystolia: Ventricular asystolia        HEALTH ISSUES - R/O PROBLEM Dx:      LINNEA YeungU NP   # CHIEF COMPLAINT: Complete heart Block    INTERVAL HISTORY: Complete Heart Block    REVIEW OF SYSTEMS: Denies Chest Pain, Lightheadedness, all others negative    MEDICATIONS  (STANDING):  losartan 100 milliGRAM(s) Oral daily  mupirocin 2% Ointment 1 Application(s) Topical daily  heparin  Injectable 5000 Unit(s) SubCutaneous every 12 hours  ceFAZolin   IVPB 2000 milliGRAM(s) IV Intermittent once    MEDICATIONS  (PRN):      Objective:  Vital Signs Last 24 Hrs  T(C): 36.8 (06 Aug 2017 03:00), Max: 36.9 (05 Aug 2017 10:46)  T(F): 98.3 (06 Aug 2017 03:00), Max: 98.4 (05 Aug 2017 10:46)  HR: 70 (06 Aug 2017 07:00) (52 - 90)  BP: 180/80 (06 Aug 2017 07:00) (121/80 - 196/96)  BP(mean): 108 (06 Aug 2017 07:00) (89 - 146)  RR: 20 (06 Aug 2017 07:00) (15 - 35)  SpO2: 98% (06 Aug 2017 07:) (96% - 100%)  ICU Vital Signs Last 24 Hrs  T(C): 36.8 (06 Aug 2017 03:00), Max: 36.9 (05 Aug 2017 10:46)  T(F): 98.3 (06 Aug 2017 03:00), Max: 98.4 (05 Aug 2017 10:46)  HR: 70 (06 Aug 2017 07:00) (52 - 90)  BP: 180/80 (06 Aug 2017 07:00) (121/80 - 196/96)  BP(mean): 108 (06 Aug 2017 07:00) (89 - 146)  ABP: --  ABP(mean): --  RR: 20 (06 Aug 2017 07:00) (15 - 35)  SpO2: 98% (06 Aug 2017 07:00) (96% - 100%)      Adult Advanced Hemodynamics Last 24 Hrs  CVP(mm Hg): --  CVP(cm H2O): --  CO: --  CI: --  PA: --  PA(mean): --  PCWP: --  SVR: --  SVRI: --  PVR: --  PVRI: --      - @ 07:01  -  08- @ 07:00  --------------------------------------------------------  IN: 180 mL / OUT: 975 mL / NET: -795 mL      Daily Height in cm: 177.8 (05 Aug 2017 10:46)    Daily Weight in k.5 (06 Aug 2017 06:00)    PHYSICAL EXAM:      Constitutional: No Acute Distress    Neuro: A+OX3    Respiratory: CTA all fileds    Cardiovascular: Irreg, reg    Gastrointestinal: +BS    Extremities: no pedal edema, L ankle fracture    Vascular: Palpable pedal pulses            TELEMETRY:     EKG:     CARDIAC CATH:     ECHO:    IMAGIN.1   8.4   )-----------( 125      ( 06 Aug 2017 04:23 )             33.2     08-06    138  |  102  |  24<H>  ----------------------------<  93  4.0   |  25  |  1.67<H>    Ca    8.4      06 Aug 2017 04:23  Mg     2.2     08-06    TPro  5.9<L>  /  Alb  3.3  /  TBili  0.7  /  DBili  x   /  AST  13  /  ALT  9<L>  /  AlkPhos  48  08-06    LIVER FUNCTIONS - ( 06 Aug 2017 04:23 )  Alb: 3.3 g/dL / Pro: 5.9 g/dL / ALK PHOS: 48 U/L / ALT: 9 U/L RC / AST: 13 U/L / GGT: x           PT/INR - ( 06 Aug 2017 04:23 )   PT: 11.6 sec;   INR: 1.07 ratio         PTT - ( 06 Aug 2017 04:23 )  PTT:29.8 sec  CKMB Units: 2.9 ng/mL ( @ 12:13)  Creatine Kinase, Serum: 90 U/L ( @ 12:13)  Troponin T, Serum: <0.01 ng/mL ( @ 12:13)      *BLOOD GAS/ARTERIAL/MIXED/VENOUS  *LACTATE  Urinalysis Basic - ( 05 Aug 2017 08:23 )    Color: Red / Appearance: x / S.010 / pH: x  Gluc: x / Ketone: Small  / Bili: Negative / Urobili: Negative mg/dL   Blood: x / Protein: 100 mg/dL / Nitrite: Negative   Leuk Esterase: Trace / RBC: >50 /HPF / WBC x   Sq Epi: x / Non Sq Epi: x / Bacteria: x        HEALTH ISSUES - PROBLEM Dx:  Syncope: Syncope  Skin lesion: Skin lesion  Fracture tibia/fibula: Fracture tibia/fibula  Renal cancer, right: Renal cancer, right  Hypertension: Hypertension  Ventricular asystolia: Ventricular asystolia        HEALTH ISSUES - R/O PROBLEM Dx:      LINNEA YeungU NP   #[id number]

## 2017-08-06 NOTE — PHYSICAL THERAPY INITIAL EVALUATION ADULT - ACTIVE RANGE OF MOTION EXAMINATION, REHAB EVAL
Right LE Active ROM was WFL (within functional limits)/bilateral upper extremity Active ROM was WFL (within functional limits)/both UE within pacemaker precautions. LLE knee extension/flexion WFL.

## 2017-08-06 NOTE — PHYSICAL THERAPY INITIAL EVALUATION ADULT - ADDITIONAL COMMENTS
As per H&P pt recently moved in with daughter due to weakness and hematuria. Prior to moving in with daughter pt was fully independent. Pt states prior to admission he was completely independent with all functional mobility and required no assistive devices. Pt reports he previously lived in a skilled nursing community, but then moved in with his daughter recently due hematuria. Pt states daughter lives in a private home with 2 steps to enter, no handrails. Pt states he has 12 stairs to reach second floor of home, handrail on left side going up.

## 2017-08-06 NOTE — PHYSICAL THERAPY INITIAL EVALUATION ADULT - PERTINENT HX OF CURRENT PROBLEM, REHAB EVAL
88 year old male transferred from [facility] for ventricular asystole x2 lasting 8-10 seconds each during radiographic imaging. Pt had 2 syncopal episodes and hurt his ankle. As per daughter, pt did not seem to be feeling well over the past few days, decreased appetite, muscle weakness and persistent hematuria as per H&P. X-rays revealed non-displaced distal fibular fracture. Pt now NWB through LLE and in short leg cast. Pt now s/p PPI. 88 year old male, hard of hearing, transferred from [facility] for ventricular asystole x2 lasting 8-10 seconds each during radiographic imaging. Pt had 2 syncopal episodes and hurt his ankle. As per daughter, pt did not seem to be feeling well over the past few days, decreased appetite, muscle weakness and persistent hematuria as per H&P. X-rays revealed non-displaced distal fibular fracture. Pt now NWB through LLE and in short leg cast. Pt now s/p PPI. 88 year old male, hard of hearing, transferred from [facility] for ventricular asystole x2 lasting 8-10 seconds each during radiographic imaging. Pt had 2 syncopal episodes and hurt his ankle. As per daughter, pt did not seem to be feeling well over the past few days, decreased appetite, muscle weakness and persistent hematuria as per H&P. X-rays revealed non-displaced distal fibular fracture. Pt now NWB through LLE and in short leg cast. Pt now s/p PPM.

## 2017-08-06 NOTE — CHART NOTE - NSCHARTNOTEFT_GEN_A_CORE
====================  CCU MIDNIGHT ROUNDS  ====================    EDITH CLARKE  [id number]    ====================  SUMMARY:  ====================    88 year old man with past medical history of HTN, Cataract, Glaucoma, melanoma dx >20 years ago s/p multiple resections, Renal CA of unknown origin w/ hematuria. Transfer from [facility] for ventricular asystole x2 lasting 8-10 seconds each during radiographic imaging. Patient had several syncopal episodes in past few days bot witnessed and unwitnessed. He states that at the time he felt light headed and passed out, no reports of vertigo, loss of bowel or bladder function, or post ictal state, also denies any numbness/tingling, slurred speech or weakness.  Transvenous Pacing wires placed and patient is for PPM. Now s/p PPM    ====================  NEW EVENTS:  ====================    s/p PPM this AM     ====================  VITALS (Last 12 hrs):  ====================    T(C): 36.3 (08-06-17 @ 19:00), Max: 36.5 (08-06-17 @ 15:30)  HR: 60 (08-06-17 @ 21:15) (60 - 68)  BP: 193/85 (08-06-17 @ 21:15) (111/59 - 193/85)  BP(mean): 122 (08-06-17 @ 21:15) (77 - 147)  RR: 23 (08-06-17 @ 21:15) (13 - 30)  SpO2: 95% (08-06-17 @ 20:00) (95% - 99%)    TELEMETRY: paced     I&O's Summary    05 Aug 2017 07:01  -  06 Aug 2017 07:00  --------------------------------------------------------  IN: 180 mL / OUT: 975 mL / NET: -795 mL    06 Aug 2017 07:01  -  06 Aug 2017 22:48  --------------------------------------------------------  IN: 290 mL / OUT: 450 mL / NET: -160 mL    ====================  PLAN:  ====================  1. CHB s/p PPM, CXR w/ out pneumo, c/w post op ancef x 3 doses   2. HTN - hypertensive w/ out symptoms, s/p IVP hydralazine, uptitrate medications as tolerated for better control     Susanna Edwards CCU NP   #[id number]

## 2017-08-07 LAB
ALBUMIN SERPL ELPH-MCNC: 3.3 G/DL — SIGNIFICANT CHANGE UP (ref 3.3–5)
ALP SERPL-CCNC: 52 U/L — SIGNIFICANT CHANGE UP (ref 40–120)
ALT FLD-CCNC: 8 U/L RC — LOW (ref 10–45)
ANION GAP SERPL CALC-SCNC: 11 MMOL/L — SIGNIFICANT CHANGE UP (ref 5–17)
AST SERPL-CCNC: 12 U/L — SIGNIFICANT CHANGE UP (ref 10–40)
BASOPHILS # BLD AUTO: 0 K/UL — SIGNIFICANT CHANGE UP (ref 0–0.2)
BASOPHILS NFR BLD AUTO: 0.4 % — SIGNIFICANT CHANGE UP (ref 0–2)
BILIRUB SERPL-MCNC: 0.4 MG/DL — SIGNIFICANT CHANGE UP (ref 0.2–1.2)
BUN SERPL-MCNC: 23 MG/DL — SIGNIFICANT CHANGE UP (ref 7–23)
CALCIUM SERPL-MCNC: 8.7 MG/DL — SIGNIFICANT CHANGE UP (ref 8.4–10.5)
CHLORIDE SERPL-SCNC: 100 MMOL/L — SIGNIFICANT CHANGE UP (ref 96–108)
CO2 SERPL-SCNC: 27 MMOL/L — SIGNIFICANT CHANGE UP (ref 22–31)
CREAT SERPL-MCNC: 1.66 MG/DL — HIGH (ref 0.5–1.3)
EOSINOPHIL # BLD AUTO: 0.3 K/UL — SIGNIFICANT CHANGE UP (ref 0–0.5)
EOSINOPHIL NFR BLD AUTO: 2.7 % — SIGNIFICANT CHANGE UP (ref 0–6)
GLUCOSE SERPL-MCNC: 95 MG/DL — SIGNIFICANT CHANGE UP (ref 70–99)
HCT VFR BLD CALC: 35.2 % — LOW (ref 39–50)
HGB BLD-MCNC: 11.9 G/DL — LOW (ref 13–17)
LYMPHOCYTES # BLD AUTO: 1.1 K/UL — SIGNIFICANT CHANGE UP (ref 1–3.3)
LYMPHOCYTES # BLD AUTO: 11.3 % — LOW (ref 13–44)
MAGNESIUM SERPL-MCNC: 2.1 MG/DL — SIGNIFICANT CHANGE UP (ref 1.6–2.6)
MCHC RBC-ENTMCNC: 33 PG — SIGNIFICANT CHANGE UP (ref 27–34)
MCHC RBC-ENTMCNC: 33.8 GM/DL — SIGNIFICANT CHANGE UP (ref 32–36)
MCV RBC AUTO: 97.7 FL — SIGNIFICANT CHANGE UP (ref 80–100)
MONOCYTES # BLD AUTO: 1.4 K/UL — HIGH (ref 0–0.9)
MONOCYTES NFR BLD AUTO: 13.8 % — SIGNIFICANT CHANGE UP (ref 2–14)
NEUTROPHILS # BLD AUTO: 7.2 K/UL — SIGNIFICANT CHANGE UP (ref 1.8–7.4)
NEUTROPHILS NFR BLD AUTO: 71.8 % — SIGNIFICANT CHANGE UP (ref 43–77)
PHOSPHATE SERPL-MCNC: 2.8 MG/DL — SIGNIFICANT CHANGE UP (ref 2.5–4.5)
PLATELET # BLD AUTO: 134 K/UL — LOW (ref 150–400)
POTASSIUM SERPL-MCNC: 3.8 MMOL/L — SIGNIFICANT CHANGE UP (ref 3.5–5.3)
POTASSIUM SERPL-SCNC: 3.8 MMOL/L — SIGNIFICANT CHANGE UP (ref 3.5–5.3)
PROT SERPL-MCNC: 6.2 G/DL — SIGNIFICANT CHANGE UP (ref 6–8.3)
RBC # BLD: 3.6 M/UL — LOW (ref 4.2–5.8)
RBC # FLD: 13.5 % — SIGNIFICANT CHANGE UP (ref 10.3–14.5)
SODIUM SERPL-SCNC: 138 MMOL/L — SIGNIFICANT CHANGE UP (ref 135–145)
WBC # BLD: 10 K/UL — SIGNIFICANT CHANGE UP (ref 3.8–10.5)
WBC # FLD AUTO: 10 K/UL — SIGNIFICANT CHANGE UP (ref 3.8–10.5)

## 2017-08-07 PROCEDURE — 93010 ELECTROCARDIOGRAM REPORT: CPT

## 2017-08-07 PROCEDURE — 99233 SBSQ HOSP IP/OBS HIGH 50: CPT | Mod: GC

## 2017-08-07 RX ORDER — CARVEDILOL PHOSPHATE 80 MG/1
6.25 CAPSULE, EXTENDED RELEASE ORAL EVERY 12 HOURS
Qty: 0 | Refills: 0 | Status: DISCONTINUED | OUTPATIENT
Start: 2017-08-07 | End: 2017-08-08

## 2017-08-07 RX ADMIN — LOSARTAN POTASSIUM 100 MILLIGRAM(S): 100 TABLET, FILM COATED ORAL at 11:52

## 2017-08-07 RX ADMIN — Medication 37.5 MILLIGRAM(S): at 05:48

## 2017-08-07 RX ADMIN — HEPARIN SODIUM 5000 UNIT(S): 5000 INJECTION INTRAVENOUS; SUBCUTANEOUS at 19:21

## 2017-08-07 RX ADMIN — Medication 100 MILLIGRAM(S): at 06:44

## 2017-08-07 RX ADMIN — HEPARIN SODIUM 5000 UNIT(S): 5000 INJECTION INTRAVENOUS; SUBCUTANEOUS at 05:49

## 2017-08-07 RX ADMIN — MUPIROCIN 1 APPLICATION(S): 20 OINTMENT TOPICAL at 19:20

## 2017-08-07 RX ADMIN — Medication 25 MILLIGRAM(S): at 05:49

## 2017-08-07 RX ADMIN — CARVEDILOL PHOSPHATE 6.25 MILLIGRAM(S): 80 CAPSULE, EXTENDED RELEASE ORAL at 12:39

## 2017-08-07 NOTE — DIETITIAN INITIAL EVALUATION ADULT. - PT NOT SOURCE
Pt is [facility] with no hearing aide, lack of interest in interview at this time, providing short answers

## 2017-08-07 NOTE — PROGRESS NOTE ADULT - SUBJECTIVE AND OBJECTIVE BOX
HPI:  87 y/o male history of HTN, Cataract, Glaucoma, melanoma dx >20 years ago s/p multiple resections, Renal CA of unknown origin w/ hematuria. Transfer from [facility] for ventricular asystole x2 lasting 8-10 seconds each during radiographic imaging. On thursday AM patient had 1 unwitnessed syncopal episode. He states that at the time he felt light headed and passed out, no reports of vertigo, loss of bowel or bladder function, or post ictal state, also denies any numbness/tingling, slurred speech or weakness. Pt did not seek medical attention at that time. In the PM of the same day patient once again had a similar syncopal episode falling and hurting his ankle. 3rd episode of syncope occured this AM in which daughter convinced him to seek medical attention. As per the daughter, the patient did not seem to be feeling well over the past few days. States he has had decrease in appetite, and seemed to not be himself in-terms of social interactions.  Denies chest pain, SOB, nausea, vomiting, headaches,     Patient was fully functional and living by self in [place] 2 months prior. Moved in w/ daughter 2/2 weakness and persistent hematuria. (05 Aug 2017 11:34)    SUBJECTIVE:  No overnight events reported. Patient was seen at bedside, found eating breakfast comfortably. He reports no active pain or any other symptoms including chest pain, dizziness/ lightheadedness or shortness of breath.     OBJECTIVE:  Review Of Systems: Negative     Allergy: No Known Allergies    Medications:  MEDICATIONS  (STANDING):  losartan 100 milliGRAM(s) Oral daily  mupirocin 2% Ointment 1 Application(s) Topical daily  heparin  Injectable 5000 Unit(s) SubCutaneous every 12 hours  metoprolol 25 milliGRAM(s) Oral two times a day  hydrochlorothiazide 37.5 milliGRAM(s) Oral daily    Vitals:  T(C): 36.5 (17 @ 07:00), Max: 36.8 (17 @ 04:00)  HR: 60 (17 @ 07:00) (60 - 76)  BP: 164/75 (17 @ 07:00) (111/59 - 194/82)  BP(mean): 97 (17 @ 07:00) (77 - 147)  RR: 23 (17 @ 07:00) (13 - 33)  SpO2: 96% (17 @ 07:00) (91% - 99%)  Wt(kg): --  Daily     Daily Weight in k.3 (07 Aug 2017 05:30)  I&O's Summary    06 Aug 2017 07:01  -  07 Aug 2017 07:00  --------------------------------------------------------  IN: 630 mL / OUT: 1200 mL / NET: -570 mL      General: A/ox 3, No acute Distress  Neck: Supple, NO JVD  Cardiac: Regular S1, S2. Grade III systolic murmur present   Pulmonary: CTAB, Breathing unlabored, No Rhonchi/Rales/Wheezing  Abdomen: Soft, Non -tender  Extremities: No Rashes, No edema. Cast in place in E     Labs:                        11.9   10.0  )-----------( 134      ( 07 Aug 2017 05:56 )             35.2     08-    138  |  100  |  23  ----------------------------<  95  3.8   |  27  |  1.66<H>    Ca    8.7      07 Aug 2017 05:56  Phos  2.8     08-  Mg     2.1     -    TPro  6.2  /  Alb  3.3  /  TBili  0.4  /  DBili  x   /  AST  12  /  ALT  8<L>  /  AlkPhos  52  08-07    PT/INR - ( 06 Aug 2017 04:23 )   PT: 11.6 sec;   INR: 1.07 ratio         PTT - ( 06 Aug 2017 04:23 )  PTT:29.8 sec  CARDIAC MARKERS ( 05 Aug 2017 12:13 )  x     / <0.01 ng/mL / 90 U/L / x     / 2.9 ng/mL      Phosphorus Level, Serum: 2.8 mg/dL ( @ 05:56)  Magnesium, Serum: 2.1 mg/dL ( @ 05:56)      ASSESSMENT and PLAN:   Patient is an 87 y/o male history of HTN, Cataract, Glaucoma, melanoma dx >20 years ago s/p multiple resections, Renal CA w/ hematuria, transferred from [facility] for multiple episodes of syncope with recorded ventricular asystole x2 lasting 8-10 seconds, s/p transvenous pacemaker placement yesterday, remains hemodynamically stable.     Ventricular Asystole   - Continue telemetry monitoring   - Family was explained the need for pacemaker implantation  - Will f/u ECHO results     Non-displaced distal fibula fracture  - Ortho was consulted, placed cast, recs appreciated  - Non-weight bearing, crutches for ambulation  - OP Ortho f/u in 1 week     Hypertension  - Continue with Losartan 100 mg qd (pt's home medication), Lopressor 25 mg bid and HCTZ 37.5 mg qd     DVT prophylaxis   - Heparin subc HPI:  89 y/o male history of HTN, Cataract, Glaucoma, melanoma dx >20 years ago s/p multiple resections, Renal CA of unknown origin w/ hematuria. Transfer from [facility] for ventricular asystole x2 lasting 8-10 seconds each during radiographic imaging. On thursday AM patient had 1 unwitnessed syncopal episode. He states that at the time he felt light headed and passed out, no reports of vertigo, loss of bowel or bladder function, or post ictal state, also denies any numbness/tingling, slurred speech or weakness. Pt did not seek medical attention at that time. In the PM of the same day patient once again had a similar syncopal episode falling and hurting his ankle. 3rd episode of syncope occured this AM in which daughter convinced him to seek medical attention. As per the daughter, the patient did not seem to be feeling well over the past few days. States he has had decrease in appetite, and seemed to not be himself in-terms of social interactions.  Denies chest pain, SOB, nausea, vomiting, headaches,     Patient was fully functional and living by self in [place] 2 months prior. Moved in w/ daughter 2/2 weakness and persistent hematuria. (05 Aug 2017 11:34)    SUBJECTIVE:  No overnight events reported. Patient was seen at bedside, found eating breakfast comfortably. He reports no active pain or any other symptoms including chest pain, dizziness/ lightheadedness or shortness of breath.     OBJECTIVE:  Review Of Systems: Negative     Allergy: No Known Allergies    Medications:  MEDICATIONS  (STANDING):  losartan 100 milliGRAM(s) Oral daily  mupirocin 2% Ointment 1 Application(s) Topical daily  heparin  Injectable 5000 Unit(s) SubCutaneous every 12 hours  metoprolol 25 milliGRAM(s) Oral two times a day  hydrochlorothiazide 37.5 milliGRAM(s) Oral daily    Vitals:  T(C): 36.5 (17 @ 07:00), Max: 36.8 (17 @ 04:00)  HR: 60 (17 @ 07:00) (60 - 76)  BP: 164/75 (17 @ 07:00) (111/59 - 194/82)  BP(mean): 97 (17 @ 07:00) (77 - 147)  RR: 23 (17 @ 07:00) (13 - 33)  SpO2: 96% (17 @ 07:00) (91% - 99%)  Wt(kg): --  Daily     Daily Weight in k.3 (07 Aug 2017 05:30)  I&O's Summary    06 Aug 2017 07:01  -  07 Aug 2017 07:00  --------------------------------------------------------  IN: 630 mL / OUT: 1200 mL / NET: -570 mL      General: A/ox 3, No acute Distress  Neck: Supple, NO JVD  Cardiac: Regular S1, S2. Grade III systolic murmur present   Pulmonary: CTAB, Breathing unlabored, No Rhonchi/Rales/Wheezing  Abdomen: Soft, Non -tender  Extremities: No Rashes, No edema. Cast in place in E     Labs:                        11.9   10.0  )-----------( 134      ( 07 Aug 2017 05:56 )             35.2     08-    138  |  100  |  23  ----------------------------<  95  3.8   |  27  |  1.66<H>    Ca    8.7      07 Aug 2017 05:56  Phos  2.8     08-  Mg     2.1     -    TPro  6.2  /  Alb  3.3  /  TBili  0.4  /  DBili  x   /  AST  12  /  ALT  8<L>  /  AlkPhos  52  08-07    PT/INR - ( 06 Aug 2017 04:23 )   PT: 11.6 sec;   INR: 1.07 ratio         PTT - ( 06 Aug 2017 04:23 )  PTT:29.8 sec  CARDIAC MARKERS ( 05 Aug 2017 12:13 )  x     / <0.01 ng/mL / 90 U/L / x     / 2.9 ng/mL      Phosphorus Level, Serum: 2.8 mg/dL ( @ 05:56)  Magnesium, Serum: 2.1 mg/dL ( @ 05:56)      ASSESSMENT and PLAN:   Patient is an 89 y/o male history of HTN, Cataract, Glaucoma, melanoma dx >20 years ago s/p multiple resections, Renal CA w/ hematuria, transferred from OSH for multiple episodes of syncope with recorded ventricular asystole x2 lasting 8-10 seconds, s/p transvenous pacemaker placement yesterday, remains hemodynamically stable.     Ventricular Asystole   - Continue telemetry monitoring   - Family was explained the need for pacemaker implantation  - Will f/u ECHO results     Non-displaced distal fibula fracture  - Ortho was consulted, placed cast, recs appreciated  - Non-weight bearing, ?rollator for ambulation  - OP Ortho f/u in 1 week     Hypertension  - Continue with Losartan 100 mg qd (pt's home medication), Lopressor 25 mg bid and HCTZ 37.5 mg qd     DVT prophylaxis   - Heparin subc HPI:  89 y/o male history of HTN, Cataract, Glaucoma, melanoma dx >20 years ago s/p multiple resections, Renal CA of unknown origin w/ hematuria. Transfer from [facility] for ventricular asystole x2 lasting 8-10 seconds each during radiographic imaging. On thursday AM patient had 1 unwitnessed syncopal episode. He states that at the time he felt light headed and passed out, no reports of vertigo, loss of bowel or bladder function, or post ictal state, also denies any numbness/tingling, slurred speech or weakness. Pt did not seek medical attention at that time. In the PM of the same day patient once again had a similar syncopal episode falling and hurting his ankle. 3rd episode of syncope occured this AM in which daughter convinced him to seek medical attention. As per the daughter, the patient did not seem to be feeling well over the past few days. States he has had decrease in appetite, and seemed to not be himself in-terms of social interactions.  Denies chest pain, SOB, nausea, vomiting, headaches,     Patient was fully functional and living by self in [place] 2 months prior. Moved in w/ daughter 2/2 weakness and persistent hematuria. (05 Aug 2017 11:34)    SUBJECTIVE:  No overnight events reported. Patient was seen at bedside, found eating breakfast comfortably. He reports no active pain or any other symptoms including chest pain, dizziness/ lightheadedness or shortness of breath.     OBJECTIVE:  Review Of Systems: Negative     Allergy: No Known Allergies    Medications:  MEDICATIONS  (STANDING):  losartan 100 milliGRAM(s) Oral daily  mupirocin 2% Ointment 1 Application(s) Topical daily  heparin  Injectable 5000 Unit(s) SubCutaneous every 12 hours  metoprolol 25 milliGRAM(s) Oral two times a day  hydrochlorothiazide 37.5 milliGRAM(s) Oral daily    Vitals:  T(C): 36.5 (17 @ 07:00), Max: 36.8 (17 @ 04:00)  HR: 60 (17 @ 07:00) (60 - 76)  BP: 164/75 (17 @ 07:00) (111/59 - 194/82)  BP(mean): 97 (17 @ 07:00) (77 - 147)  RR: 23 (17 @ 07:00) (13 - 33)  SpO2: 96% (17 @ 07:00) (91% - 99%)  Wt(kg): --  Daily     Daily Weight in k.3 (07 Aug 2017 05:30)  I&O's Summary    06 Aug 2017 07:01  -  07 Aug 2017 07:00  --------------------------------------------------------  IN: 630 mL / OUT: 1200 mL / NET: -570 mL      General: A/ox 3, No acute Distress  Neck: Supple, NO JVD  Cardiac: Regular S1, S2. Grade III systolic murmur present   Pulmonary: CTAB, Breathing unlabored, No Rhonchi/Rales/Wheezing  Abdomen: Soft, Non -tender  Extremities: No Rashes, No edema. Cast in place in E     Labs:                        11.9   10.0  )-----------( 134      ( 07 Aug 2017 05:56 )             35.2     08-    138  |  100  |  23  ----------------------------<  95  3.8   |  27  |  1.66<H>    Ca    8.7      07 Aug 2017 05:56  Phos  2.8     08-  Mg     2.1     -    TPro  6.2  /  Alb  3.3  /  TBili  0.4  /  DBili  x   /  AST  12  /  ALT  8<L>  /  AlkPhos  52  08-07    PT/INR - ( 06 Aug 2017 04:23 )   PT: 11.6 sec;   INR: 1.07 ratio         PTT - ( 06 Aug 2017 04:23 )  PTT:29.8 sec  CARDIAC MARKERS ( 05 Aug 2017 12:13 )  x     / <0.01 ng/mL / 90 U/L / x     / 2.9 ng/mL      Phosphorus Level, Serum: 2.8 mg/dL ( @ 05:56)  Magnesium, Serum: 2.1 mg/dL ( @ 05:56)      ASSESSMENT and PLAN:   Patient is an 89 y/o male history of HTN, Cataract, Glaucoma, melanoma dx >20 years ago s/p multiple resections, Renal CA w/ hematuria, transferred from OSH for multiple episodes of syncope with recorded ventricular asystole x2 lasting 8-10 seconds, s/p transvenous dual chamber pacemaker placement yesterday, remains hemodynamically stable.     Ventricular Asystole   - Continue telemetry monitoring   - Will f/u ECHO results     Non-displaced distal fibula fracture  - Ortho was consulted, placed cast, recs appreciated  - Non-weight bearing, ?rollator for ambulation  - OP Ortho f/u in 1 week     Hypertension  - Continue with Losartan 100 mg qd (pt's home medication), Lopressor 25 mg bid and HCTZ 37.5 mg qd     DVT prophylaxis   - Heparin subc

## 2017-08-07 NOTE — PROGRESS NOTE ADULT - PROBLEM SELECTOR PLAN 1
- status post PPM 8/6/17  - site wnl; monitor for hematoma/ infection  - Post implant restrictions reviewed with patient and daughter at bedside; verbalized understanding  - Wound check appointment made for 8/16/17 at 8:35am  - Continue with Telemetry monitoring   - will follow   Walter [email or web address]  [id number] / Ventricular Asystole;  - status post PPM 8/6/17  - site wnl; monitor for hematoma/ infection  - Post implant restrictions reviewed with patient and daughter at bedside; verbalized understanding  - Wound check appointment provided for 8/16/17 at 8:35am  - will monitor while inpatient   - ID card & Booklet provided   [facility]  [id number]

## 2017-08-07 NOTE — DIETITIAN INITIAL EVALUATION ADULT. - NS AS NUTRI INTERV MEALS SNACK
Reviewed alternate menu options and menu ordering procedure. Provide food preferences within therapeutic diet when requested. Encouraged pt to increase po intake of meals as tolerated and discussed importance of adequate nutrition intake.

## 2017-08-07 NOTE — DIETITIAN INITIAL EVALUATION ADULT. - OTHER INFO
Pt seen for LOS in CCU, reports UBW of 195 pounds, denies any recent wt fluctuations. Pt's current dosing wt is stable at 193 pounds. Pt reports fair appetite and po intakes of meals. Pt states 'when you're my age and not moving as much you eat less'. Pt also reports not wanting to eat more than 50% po intakes of meals as he does not want to 'feel heavy'. Noted 10-50% po intakes of meals. No GI distress noted, +BM today. Pt wears upper and lower full dentures fixed over implants, denies chewing/swallowing difficulties. NKFA. PTA denies taking supplements.

## 2017-08-07 NOTE — PROGRESS NOTE ADULT - SUBJECTIVE AND OBJECTIVE BOX
INTERVAL HPI/OVERNIGHT EVENTS: s/p PPM implant yesterday. No significant event overnight     MEDICATIONS  (STANDING):  losartan 100 milliGRAM(s) Oral daily  mupirocin 2% Ointment 1 Application(s) Topical daily  heparin  Injectable 5000 Unit(s) SubCutaneous every 12 hours  hydrochlorothiazide 37.5 milliGRAM(s) Oral daily  carvedilol 6.25 milliGRAM(s) Oral every 12 hours    MEDICATIONS  (PRN):      Allergies    No Known Allergies    Intolerances      ROS:  General: Pt denies constitutional pain/ discomfort     Cardiovascular: denies chest pain/palpitations    Respiratory and Thorax: denies SOB    Gastrointestinal: denies abdominal pain    Musculoskeletal: denies joint pain or swelling, denies restricted motion    Skin: s/p recent incision of 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    Respiratory:  breathing nonlabored; CTA B/L    Cardiovascular: (+) S1 & S2    Gastrointestinal: soft, NT, nondistended, (+) BS    Extremities: LLE with soft cast in place; able to wiggle toes, warm skin w/ cap refill < 3 seconds                    RLE w/o edema.    Skin:  Left infraclavicular s/p PPM site with Clean, dry & intact dermabond dressing. mildly ecchymotic; no hematoma/ active external bleed             + C/D/I dressings to Right shin and Right posterior upper arm         LABS:                        11.9   10.0  )-----------( 134      ( 07 Aug 2017 05:56 )             35.2     08-07    138  |  100  |  23  ----------------------------<  95  3.8   |  27  |  1.66<H>    Ca    8.7      07 Aug 2017 05:56  Phos  2.8     08-07  Mg     2.1     08-07    TPro  6.2  /  Alb  3.3  /  TBili  0.4  /  DBili  x   /  AST  12  /  ALT  8<L>  /  AlkPhos  52  08-07    PT/INR - ( 06 Aug 2017 04:23 )   PT: 11.6 sec;   INR: 1.07 ratio         PTT - ( 06 Aug 2017 04:23 )  PTT:29.8 sec      RADIOLOGY & ADDITIONAL TESTS:    TELE:  NSR, occasionally Vpaced INTERVAL HPI/OVERNIGHT EVENTS: s/p PPM implant yesterday. No significant event overnight     MEDICATIONS  (STANDING):  losartan 100 milliGRAM(s) Oral daily  mupirocin 2% Ointment 1 Application(s) Topical daily  heparin  Injectable 5000 Unit(s) SubCutaneous every 12 hours  hydrochlorothiazide 37.5 milliGRAM(s) Oral daily  carvedilol 6.25 milliGRAM(s) Oral every 12 hours    MEDICATIONS  (PRN):      Allergies    No Known Allergies    Intolerances      ROS:  General: Pt denies constitutional pain/ discomfort     Cardiovascular: denies chest pain/palpitations    Respiratory and Thorax: denies SOB    Gastrointestinal: denies abdominal pain    Musculoskeletal: denies joint pain or swelling, denies restricted motion    Skin: s/p recent incision of 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    Respiratory:  breathing nonlabored; CTA B/L    Cardiovascular: (+) S1 & S2    Gastrointestinal: soft, NT, nondistended, (+) BS    Extremities: LLE with soft cast in place; able to wiggle toes, warm skin w/ cap refill < 3 seconds                    RLE w/o edema.    Skin:  Left infraclavicular s/p PPM site with Clean, dry & intact dermabond dressing. mildly ecchymotic; no hematoma/ active external bleed             + C/D/I dressings to Right shin and Right posterior upper arm         LABS:                        11.9   10.0  )-----------( 134      ( 07 Aug 2017 05:56 )             35.2     08-07    138  |  100  |  23  ----------------------------<  95  3.8   |  27  |  1.66<H>    Ca    8.7      07 Aug 2017 05:56  Phos  2.8     08-07  Mg     2.1     08-07    TPro  6.2  /  Alb  3.3  /  TBili  0.4  /  DBili  x   /  AST  12  /  ALT  8<L>  /  AlkPhos  52  08-07    PT/INR - ( 06 Aug 2017 04:23 )   PT: 11.6 sec;   INR: 1.07 ratio         PTT - ( 06 Aug 2017 04:23 )  PTT:29.8 sec      RADIOLOGY: chest xray: no pneumothorax.     TELE:  NSR, occasionally Vpaced INTERVAL HPI/OVERNIGHT EVENTS: s/p PPM implant yesterday. No significant event overnight     MEDICATIONS  (STANDING):  losartan 100 milliGRAM(s) Oral daily  mupirocin 2% Ointment 1 Application(s) Topical daily  heparin  Injectable 5000 Unit(s) SubCutaneous every 12 hours  hydrochlorothiazide 37.5 milliGRAM(s) Oral daily  carvedilol 6.25 milliGRAM(s) Oral every 12 hours    MEDICATIONS  (PRN):      Allergies    No Known Allergies    Intolerances      ROS:  General: Pt denies constitutional pain/ discomfort     Cardiovascular: denies chest pain/palpitations    Respiratory and Thorax: denies SOB    Gastrointestinal: denies abdominal pain    Musculoskeletal: denies joint pain or swelling, denies restricted motion    Skin: s/p recent incision of 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    Respiratory:  breathing nonlabored; CTA B/L    Cardiovascular: (+) S1 & S2    Gastrointestinal: soft, NT, nondistended, (+) BS    Extremities: LLE with soft cast in place; able to wiggle toes, warm skin w/ cap refill < 3 seconds                    RLE w/o edema.    Skin:  Left infraclavicular s/p PPM site with Clean, dry & intact dermabond dressing. mildly ecchymotic; no hematoma/ active external bleed             + Clean/Dry/Intact dressings to Right shin and Right posterior upper arm ( squamous cell excision sites)         LABS:                        11.9   10.0  )-----------( 134      ( 07 Aug 2017 05:56 )             35.2     08-07    138  |  100  |  23  ----------------------------<  95  3.8   |  27  |  1.66<H>    Ca    8.7      07 Aug 2017 05:56  Phos  2.8     08-07  Mg     2.1     08-07    TPro  6.2  /  Alb  3.3  /  TBili  0.4  /  DBili  x   /  AST  12  /  ALT  8<L>  /  AlkPhos  52  08-07    PT/INR - ( 06 Aug 2017 04:23 )   PT: 11.6 sec;   INR: 1.07 ratio         PTT - ( 06 Aug 2017 04:23 )  PTT:29.8 sec      RADIOLOGY: chest xray: no pneumothorax.     TELE:  NSR, occasionally Vpaced

## 2017-08-07 NOTE — PROGRESS NOTE ADULT - ASSESSMENT
89 y/o male  with past medical history of HTN, Cataract, Glaucoma, skin ca s/p multiple resections, Renal CA of unknown origin w/ hematuria. Transfer from [facility] for ventricular asystole x2 lasting 8-10 seconds each during radiographic imaging; Now status post PPM. 87 y/o male  with past medical history of HTN, Cataract, Glaucoma, skin ca s/p multiple resections, Renal CA of unknown origin w/ hematuria. Transfer from [facility] for ventricular asystole x2 lasting 8-10 seconds; Now status post PPM on 8/6/17.

## 2017-08-07 NOTE — PROGRESS NOTE ADULT - SUBJECTIVE AND OBJECTIVE BOX
====================  CCU MIDNIGHT ROUNDS  ====================    EDITH CLARKE  [id number]  Patient is a 88y old  Male who presents with a chief complaint of Syncope (05 Aug 2017 11:34)    ====================  SUMMARY:  ====================  89 y/o male history of HTN, Cataract, Glaucoma, melanoma dx >20 years ago s/p multiple resections, Renal CA of unknown origin w/ hematuria. Transfer from [facility] for ventricular asystole x2 lasting 8-10 seconds each during radiographic imaging. On thursday AM patient had 1 unwitnessed syncopal episode.    ====================  NEW EVENTS:  ====================  Patient s/p PPM insertion, now Vpaced at 60. No PPM malfunction noted.  Sitting on chair with no complaints.    MEDICATIONS  (STANDING):  losartan 100 milliGRAM(s) Oral daily  mupirocin 2% Ointment 1 Application(s) Topical daily  heparin  Injectable 5000 Unit(s) SubCutaneous every 12 hours  hydrochlorothiazide 37.5 milliGRAM(s) Oral daily  carvedilol 6.25 milliGRAM(s) Oral every 12 hours    MEDICATIONS  (PRN):    ====================  VITALS (Last 12 hrs):  ====================    T(C): 36.8 (08-07-17 @ 19:00), Max: 36.8 (08-07-17 @ 19:00)  T(F): 98.3 (08-07-17 @ 19:00), Max: 98.3 (08-07-17 @ 19:00)  HR: 60 (08-07-17 @ 20:00) (60 - 60)  BP: 137/65 (08-07-17 @ 20:00) (107/52 - 183/70)  BP(mean): 89 (08-07-17 @ 20:00) (71 - 117)  ABP: --  ABP(mean): --  RR: 16 (08-07-17 @ 20:00) (9 - 20)  SpO2: 95% (08-07-17 @ 19:00) (95% - 95%)  Wt(kg): --  CVP(mm Hg): --  CVP(cm H2O): --  CO: --  CI: --  PA: --  PA(mean): --  PCWP: --  SVR: --  PVR: --    I&O's Summary    06 Aug 2017 07:01  -  07 Aug 2017 07:00  --------------------------------------------------------  IN: 630 mL / OUT: 1200 mL / NET: -570 mL    07 Aug 2017 07:01  -  07 Aug 2017 22:29  --------------------------------------------------------  IN: 600 mL / OUT: 650 mL / NET: -50 mL    ====================  NEW LABS:  ====================    08-07    138  |  100  |  23  ----------------------------<  95  3.8   |  27  |  1.66<H>    Ca    8.7      07 Aug 2017 05:56  Phos  2.8     08-07  Mg     2.1     08-07    TPro  6.2  /  Alb  3.3  /  TBili  0.4  /  DBili  x   /  AST  12  /  ALT  8<L>  /  AlkPhos  52  08-07    PT/INR - ( 06 Aug 2017 04:23 )   PT: 11.6 sec;   INR: 1.07 ratio      PTT - ( 06 Aug 2017 04:23 )  PTT:29.8 sec    ====================  PLAN:  ====================  - Continue home meds  - Pain meds prn  - DVT prophylaxis  - Plan to d/c home in [facility] NP  Beeper #[id number]  Spectra # [phone] ====================  CCU MIDNIGHT ROUNDS  ====================    EDITH CLARKE  [id number]  Patient is a 88y old  Male who presents with a chief complaint of Syncope (05 Aug 2017 11:34)    ====================  SUMMARY:  ====================  87 y/o male history of HTN, Cataract, Glaucoma, melanoma dx >20 years ago s/p multiple resections, Renal CA of unknown origin w/ hematuria. Transfer from [facility] for ventricular asystole x2 lasting 8-10 seconds each during radiographic imaging. On thursday AM patient had 1 unwitnessed syncopal episode.    ====================  NEW EVENTS:  ====================  Patient s/p PPM insertion, now Vpaced at 60. No PPM malfunction noted.  Sitting on chair with no complaints.    MEDICATIONS  (STANDING):  losartan 100 milliGRAM(s) Oral daily  mupirocin 2% Ointment 1 Application(s) Topical daily  heparin  Injectable 5000 Unit(s) SubCutaneous every 12 hours  hydrochlorothiazide 37.5 milliGRAM(s) Oral daily  carvedilol 6.25 milliGRAM(s) Oral every 12 hours    MEDICATIONS  (PRN):    ====================  VITALS (Last 12 hrs):  ====================    T(C): 36.8 (08-07-17 @ 19:00), Max: 36.8 (08-07-17 @ 19:00)  T(F): 98.3 (08-07-17 @ 19:00), Max: 98.3 (08-07-17 @ 19:00)  HR: 60 (08-07-17 @ 20:00) (60 - 60)  BP: 137/65 (08-07-17 @ 20:00) (107/52 - 183/70)  BP(mean): 89 (08-07-17 @ 20:00) (71 - 117)  ABP: --  ABP(mean): --  RR: 16 (08-07-17 @ 20:00) (9 - 20)  SpO2: 95% (08-07-17 @ 19:00) (95% - 95%)  Wt(kg): --  CVP(mm Hg): --  CVP(cm H2O): --  CO: --  CI: --  PA: --  PA(mean): --  PCWP: --  SVR: --  PVR: --    I&O's Summary    06 Aug 2017 07:01  -  07 Aug 2017 07:00  --------------------------------------------------------  IN: 630 mL / OUT: 1200 mL / NET: -570 mL    07 Aug 2017 07:01  -  07 Aug 2017 22:29  --------------------------------------------------------  IN: 600 mL / OUT: 650 mL / NET: -50 mL    ====================  NEW LABS:  ====================    08-07    138  |  100  |  23  ----------------------------<  95  3.8   |  27  |  1.66<H>    Ca    8.7      07 Aug 2017 05:56  Phos  2.8     08-07  Mg     2.1     08-07    TPro  6.2  /  Alb  3.3  /  TBili  0.4  /  DBili  x   /  AST  12  /  ALT  8<L>  /  AlkPhos  52  08-07    PT/INR - ( 06 Aug 2017 04:23 )   PT: 11.6 sec;   INR: 1.07 ratio      PTT - ( 06 Aug 2017 04:23 )  PTT:29.8 sec    ====================  PLAN:  ====================  - Continue home meds  - Pain meds prn  - DVT prophylaxis  - Increase activity  - Follow up with PT  - Plan to d/c to Rehab  - To follow with Ortho 1 week after discharge    [facility] NP  Beeper #[id number]  Spectra # [phone]

## 2017-08-07 NOTE — PROGRESS NOTE ADULT - SUBJECTIVE AND OBJECTIVE BOX
HPI:  87 y/o male history of HTN, Cataract, Glaucoma, melanoma dx >20 years ago s/p multiple resections, Renal CA of unknown origin w/ hematuria. Transfer from [facility] for ventricular asystole x2 lasting 8-10 seconds each during radiographic imaging. On thursday AM patient had 1 unwitnessed syncopal episode. He states that at the time he felt light headed and passed out, no reports of vertigo, loss of bowel or bladder function, or post ictal state, also denies any numbness/tingling, slurred speech or weakness. Pt did not seek medical attention at that time. In the PM of the same day patient once again had a similar syncopal episode falling and hurting his ankle. 3rd episode of syncope occured this AM in which daughter convinced him to seek medical attention. As per the daughter, the patient did not seem to be feeling well over the past few days. States he has had decrease in appetite, and seemed to not be himself in-terms of social interactions.  Denies chest pain, SOB, nausea, vomiting, headaches,     Patient was fully functional and living by self in [place] 2 months prior. Moved in w/ daughter 2/2 weakness and persistent hematuria. (05 Aug 2017 11:34)    SUBJECTIVE:  No overnight events reported. Patient was seen at bedside, found eating breakfast comfortably. He reports no active pain or any other symptoms including chest pain, dizziness/ lightheadedness or shortness of breath.     OBJECTIVE:  Review Of Systems: Negative     Allergy: No Known Allergies    Medications:  MEDICATIONS  (STANDING):  losartan 100 milliGRAM(s) Oral daily  mupirocin 2% Ointment 1 Application(s) Topical daily  heparin  Injectable 5000 Unit(s) SubCutaneous every 12 hours  metoprolol 25 milliGRAM(s) Oral two times a day  hydrochlorothiazide 37.5 milliGRAM(s) Oral daily    Vitals:  T(C): 36.5 (17 @ 07:00), Max: 36.8 (17 @ 04:00)  HR: 60 (17 @ 07:00) (60 - 76)  BP: 164/75 (17 @ 07:00) (111/59 - 194/82)  BP(mean): 97 (17 @ 07:00) (77 - 147)  RR: 23 (17 @ 07:00) (13 - 33)  SpO2: 96% (17 @ 07:00) (91% - 99%)  Wt(kg): --  Daily     Daily Weight in k.3 (07 Aug 2017 05:30)  I&O's Summary    06 Aug 2017 07:01  -  07 Aug 2017 07:00  --------------------------------------------------------  IN: 630 mL / OUT: 1200 mL / NET: -570 mL      General: A/ox 3, No acute Distress  Neck: Supple, NO JVD  Cardiac: Regular S1, S2. Grade III systolic murmur present   Pulmonary: CTAB, Breathing unlabored, No Rhonchi/Rales/Wheezing  Abdomen: Soft, Non -tender  Extremities: No Rashes, No edema. Cast in place in [facility]     Labs:                        11.9   10.0  )-----------( 134      ( 07 Aug 2017 05:56 )             35.2     08-    138  |  100  |  23  ----------------------------<  95  3.8   |  27  |  1.66<H>    Ca    8.7      07 Aug 2017 05:56  Phos  2.8     08-  Mg     2.1     -    TPro  6.2  /  Alb  3.3  /  TBili  0.4  /  DBili  x   /  AST  12  /  ALT  8<L>  /  AlkPhos  52  08-07    PT/INR - ( 06 Aug 2017 04:23 )   PT: 11.6 sec;   INR: 1.07 ratio         PTT - ( 06 Aug 2017 04:23 )  PTT:29.8 sec  CARDIAC MARKERS ( 05 Aug 2017 12:13 )  x     / <0.01 ng/mL / 90 U/L / x     / 2.9 ng/mL      Phosphorus Level, Serum: 2.8 mg/dL ( @ 05:56)  Magnesium, Serum: 2.1 mg/dL ( @ 05:56)

## 2017-08-07 NOTE — DIETITIAN INITIAL EVALUATION ADULT. - PERTINENT LABORATORY DATA
Na 138 [135 - 145], K+ 3.8 [3.5 - 5.3], BUN 23 [7 - 23], Cr 1.66 [0.50 - 1.30], BG 95 [70 - 99], Phos 2.8 [2.5 - 4.5], Alk Phos 52 [40 - 120], AST 12 [10 - 40], ALT 8 [10 - 45], Mg 2.1 [1.6 - 2.6], Ca 8.7 [8.4 - 10.5], HbA1c --

## 2017-08-07 NOTE — DIETITIAN INITIAL EVALUATION ADULT. - ENERGY NEEDS
Height:70 inches, Weight: 193 pounds  BMI: 27.7 kg/m2 IBW: 166 pounds (+/-10%), %IBW:116%  Pertinent Info: Per chart, 89 y/o male with PMH of glaucoma, melanoma dx 20 years ago, right renal cancer  admitted s/p syncope episode with ventricular asystole with complete heart block, s/p PPD placement, hematuria, and fracture of distal fibula /tibia. No edema, no pressure ulcers noted at this time.

## 2017-08-07 NOTE — PROGRESS NOTE ADULT - ATTENDING COMMENTS
Patient is seen and examined with fellow, NP and the CCU house-staff. I agree with the history, physical and the assessment and plan.  s/p PPM - check CXR - post implant Abx  DNR is reinstated  out-patinet work up for the AS
Patient is seen and examined with fellow, NP and the CCU house-staff. I agree with the history, physical and the assessment and plan.  CHB s/p PPM - CXR with no PTX  further evaluation for the AS as out-patient  f/u with ortho

## 2017-08-07 NOTE — PROGRESS NOTE ADULT - ASSESSMENT
ASSESSMENT and PLAN:   Patient is an 89 y/o male history of HTN, Cataract, Glaucoma, melanoma dx >20 years ago s/p multiple resections, Renal CA w/ hematuria, transferred from OSH for multiple episodes of syncope with recorded ventricular asystole x2 lasting 8-10 seconds, s/p transvenous dual chamber pacemaker placement yesterday, remains hemodynamically stable.     Ventricular Asystole   - Continue telemetry monitoring   - Will f/u ECHO results     Non-displaced distal fibula fracture  - Ortho was consulted, placed cast, recs appreciated  - Non-weight bearing, ?rollator for ambulation  - OP Ortho f/u in 1 week     Hypertension  - Continue with Losartan 100 mg qd (pt's home medication), Lopressor 25 mg bid and HCTZ 37.5 mg qd     DVT prophylaxis   - Heparin subQ

## 2017-08-07 NOTE — DIETITIAN INITIAL EVALUATION ADULT. - NS AS NUTRI INTERV MEDICAL AND FOOD SUPPLEMENTS
Pt declines supplements at this time, states 'I really don't have any issues with food here' and he is eating <50% by choice as pt does not like the feeling of fullness. RD availability made known

## 2017-08-08 ENCOUNTER — TRANSCRIPTION ENCOUNTER (OUTPATIENT)
Age: 82
End: 2017-08-08

## 2017-08-08 VITALS
RESPIRATION RATE: 17 BRPM | SYSTOLIC BLOOD PRESSURE: 149 MMHG | TEMPERATURE: 98 F | OXYGEN SATURATION: 98 % | DIASTOLIC BLOOD PRESSURE: 67 MMHG | HEART RATE: 68 BPM

## 2017-08-08 LAB
ALBUMIN SERPL ELPH-MCNC: 3.3 G/DL — SIGNIFICANT CHANGE UP (ref 3.3–5)
ALP SERPL-CCNC: 54 U/L — SIGNIFICANT CHANGE UP (ref 40–120)
ALT FLD-CCNC: 6 U/L RC — LOW (ref 10–45)
ANION GAP SERPL CALC-SCNC: 15 MMOL/L — SIGNIFICANT CHANGE UP (ref 5–17)
AST SERPL-CCNC: 12 U/L — SIGNIFICANT CHANGE UP (ref 10–40)
BASOPHILS # BLD AUTO: 0.1 K/UL — SIGNIFICANT CHANGE UP (ref 0–0.2)
BASOPHILS NFR BLD AUTO: 0.8 % — SIGNIFICANT CHANGE UP (ref 0–2)
BILIRUB SERPL-MCNC: 0.4 MG/DL — SIGNIFICANT CHANGE UP (ref 0.2–1.2)
BUN SERPL-MCNC: 25 MG/DL — HIGH (ref 7–23)
CALCIUM SERPL-MCNC: 9 MG/DL — SIGNIFICANT CHANGE UP (ref 8.4–10.5)
CHLORIDE SERPL-SCNC: 98 MMOL/L — SIGNIFICANT CHANGE UP (ref 96–108)
CO2 SERPL-SCNC: 26 MMOL/L — SIGNIFICANT CHANGE UP (ref 22–31)
CREAT SERPL-MCNC: 1.5 MG/DL — HIGH (ref 0.5–1.3)
EOSINOPHIL # BLD AUTO: 0.3 K/UL — SIGNIFICANT CHANGE UP (ref 0–0.5)
EOSINOPHIL NFR BLD AUTO: 4 % — SIGNIFICANT CHANGE UP (ref 0–6)
GLUCOSE SERPL-MCNC: 91 MG/DL — SIGNIFICANT CHANGE UP (ref 70–99)
HCT VFR BLD CALC: 38.5 % — LOW (ref 39–50)
HGB BLD-MCNC: 13 G/DL — SIGNIFICANT CHANGE UP (ref 13–17)
LYMPHOCYTES # BLD AUTO: 1.1 K/UL — SIGNIFICANT CHANGE UP (ref 1–3.3)
LYMPHOCYTES # BLD AUTO: 13 % — SIGNIFICANT CHANGE UP (ref 13–44)
MAGNESIUM SERPL-MCNC: 2.1 MG/DL — SIGNIFICANT CHANGE UP (ref 1.6–2.6)
MCHC RBC-ENTMCNC: 33 PG — SIGNIFICANT CHANGE UP (ref 27–34)
MCHC RBC-ENTMCNC: 33.9 GM/DL — SIGNIFICANT CHANGE UP (ref 32–36)
MCV RBC AUTO: 97.3 FL — SIGNIFICANT CHANGE UP (ref 80–100)
MONOCYTES # BLD AUTO: 1.1 K/UL — HIGH (ref 0–0.9)
MONOCYTES NFR BLD AUTO: 13.3 % — SIGNIFICANT CHANGE UP (ref 2–14)
NEUTROPHILS # BLD AUTO: 5.9 K/UL — SIGNIFICANT CHANGE UP (ref 1.8–7.4)
NEUTROPHILS NFR BLD AUTO: 68.9 % — SIGNIFICANT CHANGE UP (ref 43–77)
PHOSPHATE SERPL-MCNC: 3.2 MG/DL — SIGNIFICANT CHANGE UP (ref 2.5–4.5)
PLATELET # BLD AUTO: 144 K/UL — LOW (ref 150–400)
POTASSIUM SERPL-MCNC: 3.8 MMOL/L — SIGNIFICANT CHANGE UP (ref 3.5–5.3)
POTASSIUM SERPL-SCNC: 3.8 MMOL/L — SIGNIFICANT CHANGE UP (ref 3.5–5.3)
PROT SERPL-MCNC: 6.2 G/DL — SIGNIFICANT CHANGE UP (ref 6–8.3)
RBC # BLD: 3.95 M/UL — LOW (ref 4.2–5.8)
RBC # FLD: 13.3 % — SIGNIFICANT CHANGE UP (ref 10.3–14.5)
SODIUM SERPL-SCNC: 139 MMOL/L — SIGNIFICANT CHANGE UP (ref 135–145)
WBC # BLD: 8.6 K/UL — SIGNIFICANT CHANGE UP (ref 3.8–10.5)
WBC # FLD AUTO: 8.6 K/UL — SIGNIFICANT CHANGE UP (ref 3.8–10.5)

## 2017-08-08 PROCEDURE — 82947 ASSAY GLUCOSE BLOOD QUANT: CPT

## 2017-08-08 PROCEDURE — 93010 ELECTROCARDIOGRAM REPORT: CPT

## 2017-08-08 PROCEDURE — 82803 BLOOD GASES ANY COMBINATION: CPT

## 2017-08-08 PROCEDURE — 82550 ASSAY OF CK (CPK): CPT

## 2017-08-08 PROCEDURE — C1892: CPT

## 2017-08-08 PROCEDURE — C1785: CPT

## 2017-08-08 PROCEDURE — 84443 ASSAY THYROID STIM HORMONE: CPT

## 2017-08-08 PROCEDURE — 83036 HEMOGLOBIN GLYCOSYLATED A1C: CPT

## 2017-08-08 PROCEDURE — 82435 ASSAY OF BLOOD CHLORIDE: CPT

## 2017-08-08 PROCEDURE — 82330 ASSAY OF CALCIUM: CPT

## 2017-08-08 PROCEDURE — 86850 RBC ANTIBODY SCREEN: CPT

## 2017-08-08 PROCEDURE — 93005 ELECTROCARDIOGRAM TRACING: CPT

## 2017-08-08 PROCEDURE — 84295 ASSAY OF SERUM SODIUM: CPT

## 2017-08-08 PROCEDURE — 83735 ASSAY OF MAGNESIUM: CPT

## 2017-08-08 PROCEDURE — 80053 COMPREHEN METABOLIC PANEL: CPT

## 2017-08-08 PROCEDURE — 99285 EMERGENCY DEPT VISIT HI MDM: CPT

## 2017-08-08 PROCEDURE — 71045 X-RAY EXAM CHEST 1 VIEW: CPT

## 2017-08-08 PROCEDURE — 73590 X-RAY EXAM OF LOWER LEG: CPT

## 2017-08-08 PROCEDURE — 84132 ASSAY OF SERUM POTASSIUM: CPT

## 2017-08-08 PROCEDURE — 85027 COMPLETE CBC AUTOMATED: CPT

## 2017-08-08 PROCEDURE — C1898: CPT

## 2017-08-08 PROCEDURE — 85730 THROMBOPLASTIN TIME PARTIAL: CPT

## 2017-08-08 PROCEDURE — 85014 HEMATOCRIT: CPT

## 2017-08-08 PROCEDURE — 97162 PT EVAL MOD COMPLEX 30 MIN: CPT

## 2017-08-08 PROCEDURE — 73610 X-RAY EXAM OF ANKLE: CPT

## 2017-08-08 PROCEDURE — 86901 BLOOD TYPING SEROLOGIC RH(D): CPT

## 2017-08-08 PROCEDURE — 97110 THERAPEUTIC EXERCISES: CPT

## 2017-08-08 PROCEDURE — 93306 TTE W/DOPPLER COMPLETE: CPT

## 2017-08-08 PROCEDURE — 99233 SBSQ HOSP IP/OBS HIGH 50: CPT | Mod: GC

## 2017-08-08 PROCEDURE — 82553 CREATINE MB FRACTION: CPT

## 2017-08-08 PROCEDURE — 85610 PROTHROMBIN TIME: CPT

## 2017-08-08 PROCEDURE — 33208 INSRT HEART PM ATRIAL & VENT: CPT | Mod: KX

## 2017-08-08 PROCEDURE — 84100 ASSAY OF PHOSPHORUS: CPT

## 2017-08-08 PROCEDURE — 86900 BLOOD TYPING SEROLOGIC ABO: CPT

## 2017-08-08 PROCEDURE — 80061 LIPID PANEL: CPT

## 2017-08-08 PROCEDURE — 83605 ASSAY OF LACTIC ACID: CPT

## 2017-08-08 PROCEDURE — 84484 ASSAY OF TROPONIN QUANT: CPT

## 2017-08-08 RX ORDER — POTASSIUM CHLORIDE 20 MEQ
40 PACKET (EA) ORAL ONCE
Qty: 0 | Refills: 0 | Status: DISCONTINUED | OUTPATIENT
Start: 2017-08-08 | End: 2017-08-08

## 2017-08-08 RX ORDER — MUPIROCIN 20 MG/G
1 OINTMENT TOPICAL
Qty: 0 | Refills: 0 | COMMUNITY
Start: 2017-08-08

## 2017-08-08 RX ORDER — CARVEDILOL PHOSPHATE 80 MG/1
12.5 CAPSULE, EXTENDED RELEASE ORAL EVERY 12 HOURS
Qty: 0 | Refills: 0 | Status: DISCONTINUED | OUTPATIENT
Start: 2017-08-08 | End: 2017-08-08

## 2017-08-08 RX ORDER — HYDRALAZINE HCL 50 MG
10 TABLET ORAL ONCE
Qty: 0 | Refills: 0 | Status: COMPLETED | OUTPATIENT
Start: 2017-08-08 | End: 2017-08-08

## 2017-08-08 RX ORDER — LOSARTAN POTASSIUM 100 MG/1
1 TABLET, FILM COATED ORAL
Qty: 0 | Refills: 0 | COMMUNITY

## 2017-08-08 RX ORDER — CARVEDILOL PHOSPHATE 80 MG/1
1 CAPSULE, EXTENDED RELEASE ORAL
Qty: 0 | Refills: 0 | COMMUNITY
Start: 2017-08-08

## 2017-08-08 RX ORDER — HEPARIN SODIUM 5000 [USP'U]/ML
5000 INJECTION INTRAVENOUS; SUBCUTANEOUS
Qty: 0 | Refills: 0 | COMMUNITY
Start: 2017-08-08

## 2017-08-08 RX ORDER — POTASSIUM CHLORIDE 20 MEQ
20 PACKET (EA) ORAL ONCE
Qty: 0 | Refills: 0 | Status: COMPLETED | OUTPATIENT
Start: 2017-08-08 | End: 2017-08-08

## 2017-08-08 RX ORDER — LOSARTAN POTASSIUM 100 MG/1
1 TABLET, FILM COATED ORAL
Qty: 0 | Refills: 0 | COMMUNITY
Start: 2017-08-08

## 2017-08-08 RX ADMIN — Medication 37.5 MILLIGRAM(S): at 05:31

## 2017-08-08 RX ADMIN — CARVEDILOL PHOSPHATE 12.5 MILLIGRAM(S): 80 CAPSULE, EXTENDED RELEASE ORAL at 17:06

## 2017-08-08 RX ADMIN — HEPARIN SODIUM 5000 UNIT(S): 5000 INJECTION INTRAVENOUS; SUBCUTANEOUS at 05:32

## 2017-08-08 RX ADMIN — HEPARIN SODIUM 5000 UNIT(S): 5000 INJECTION INTRAVENOUS; SUBCUTANEOUS at 17:06

## 2017-08-08 RX ADMIN — MUPIROCIN 1 APPLICATION(S): 20 OINTMENT TOPICAL at 12:13

## 2017-08-08 RX ADMIN — CARVEDILOL PHOSPHATE 6.25 MILLIGRAM(S): 80 CAPSULE, EXTENDED RELEASE ORAL at 05:32

## 2017-08-08 RX ADMIN — LOSARTAN POTASSIUM 100 MILLIGRAM(S): 100 TABLET, FILM COATED ORAL at 12:13

## 2017-08-08 RX ADMIN — Medication 10 MILLIGRAM(S): at 00:39

## 2017-08-08 RX ADMIN — Medication 20 MILLIEQUIVALENT(S): at 08:00

## 2017-08-08 NOTE — DISCHARGE NOTE ADULT - PATIENT PORTAL LINK FT
“You can access the [employer] Patient Portal, offered by [facility], by registering with the following website: [email or web address]”

## 2017-08-08 NOTE — PROGRESS NOTE ADULT - ASSESSMENT
Patient is an 87 y/o male history of HTN, Cataract, Glaucoma, melanoma dx >20 years ago s/p multiple resections, Renal CA w/ hematuria, transferred from OSH for multiple episodes of syncope with recorded ventricular asystole x2 lasting 8-10 seconds, s/p transvenous dual chamber pacemaker placement on 8/6/17, remains hemodynamically stable.     Ventricular Asystole, s/p PPM    - Continue telemetry monitoring     Non-displaced distal fibula fracture  - Ortho was consulted, placed cast, recs appreciated  - Non-weight bearing, ?rollator for ambulation  - OP Ortho f/u in 1 week after discharge  - Pending dispo to Rehab   - Patient should be discharged with DVT prophylaxis and remain on it until become mobile as per Ortho     Hypertension, uncontrolled   - Continue with Losartan 100 mg qd (pt's home medication) and HCTZ 37.5 mg qd. Will increase Carvedilol dose to 12.5 mg bid      DVT prophylaxis   - Heparin subQ

## 2017-08-08 NOTE — PROGRESS NOTE ADULT - SUBJECTIVE AND OBJECTIVE BOX
CCU Progress Note     SUBJECTIVE:  Patient  was seen and examined at bedside, found to have no active complaints. Pt was given Hydralazine 10 mg IVP overnight for /83.     OBJECTIVE:  Review Of Systems: Negative     Allergy: No Known Allergies    Medications:  MEDICATIONS  (STANDING):  losartan 100 milliGRAM(s) Oral daily  mupirocin 2% Ointment 1 Application(s) Topical daily  heparin  Injectable 5000 Unit(s) SubCutaneous every 12 hours  hydrochlorothiazide 37.5 milliGRAM(s) Oral daily  carvedilol 6.25 milliGRAM(s) Oral every 12 hours  potassium chloride    Tablet ER 40 milliEquivalent(s) Oral once    Vitals:  T(C): 36.4 (17 @ 05:00), Max: 36.8 (17 @ 19:00)  HR: 58 (17 @ 07:00) (58 - 74)  BP: 116/57 (17 @ 07:00) (107/52 - 205/83)  BP(mean): 84 (17 @ 07:00) (70 - 136)  RR: 14 (17 @ 07:00) (9 - 26)  SpO2: 95% (17 @ 05:00) (95% - 95%)  Wt(kg): --  Daily     Daily Weight in k.5 (07 Aug 2017 16:06)  I&O's Summary    07 Aug 2017 07:  -  08 Aug 2017 07:00  --------------------------------------------------------  IN: 960 mL / OUT: 2200 mL / NET: -1240 mL    08 Aug 2017 07:  -  08 Aug 2017 08:29  --------------------------------------------------------  IN: 0 mL / OUT: 175 mL / NET: -175 mL    Physical Exam:   General: A/ox 3, No acute Distress  Neck: Supple, NO JVD  Cardiac: Regular S1, S2. Grade III systolic murmur present   Pulmonary: CTAB, Breathing unlabored, No Rhonchi/Rales/Wheezing  Abdomen: Soft, Non -tender  Extremities: No Rashes, No edema. Cast in place in E       Labs:                        13.0   8.6   )-----------( 144      ( 08 Aug 2017 05:39 )             38.5         139  |  98  |  25<H>  ----------------------------<  91  3.8   |  26  |  1.50<H>    Ca    9.0      08 Aug 2017 05:39  Phos  3.2       Mg     2.1         TPro  6.2  /  Alb  3.3  /  TBili  0.4  /  DBili  x   /  AST  12  /  ALT  6<L>  /  AlkPhos  54      Magnesium, Serum: 2.1 mg/dL ( @ 05:39)  Phosshorus Level, Serum: 3.2 mg/dL ( @ 05:39)

## 2017-08-08 NOTE — PROGRESS NOTE ADULT - SUBJECTIVE AND OBJECTIVE BOX
CCU Progress Note     SUBJECTIVE:  Patient  was seen and examined at bedside, found to have no active complaints. Pt was given Hydralazine 10 mg IVP overnight for /83.     OBJECTIVE:  Review Of Systems: Negative     Allergy: No Known Allergies    Medications:  MEDICATIONS  (STANDING):  losartan 100 milliGRAM(s) Oral daily  mupirocin 2% Ointment 1 Application(s) Topical daily  heparin  Injectable 5000 Unit(s) SubCutaneous every 12 hours  hydrochlorothiazide 37.5 milliGRAM(s) Oral daily  carvedilol 6.25 milliGRAM(s) Oral every 12 hours  potassium chloride    Tablet ER 40 milliEquivalent(s) Oral once    Vitals:  T(C): 36.4 (17 @ 05:00), Max: 36.8 (17 @ 19:00)  HR: 58 (17 @ 07:00) (58 - 74)  BP: 116/57 (17 @ 07:00) (107/52 - 205/83)  BP(mean): 84 (17 @ 07:00) (70 - 136)  RR: 14 (17 @ 07:00) (9 - 26)  SpO2: 95% (17 @ 05:00) (95% - 95%)  Wt(kg): --  Daily     Daily Weight in k.5 (07 Aug 2017 16:06)  I&O's Summary    07 Aug 2017 07:  -  08 Aug 2017 07:00  --------------------------------------------------------  IN: 960 mL / OUT: 2200 mL / NET: -1240 mL    08 Aug 2017 07:  -  08 Aug 2017 08:29  --------------------------------------------------------  IN: 0 mL / OUT: 175 mL / NET: -175 mL    Physical Exam:   General: A/ox 3, No acute Distress  Neck: Supple, NO JVD  Cardiac: Regular S1, S2. Grade III systolic murmur present   Pulmonary: CTAB, Breathing unlabored, No Rhonchi/Rales/Wheezing  Abdomen: Soft, Non -tender  Extremities: No Rashes, No edema. Cast in place in LLE       Labs:                        13.0   8.6   )-----------( 144      ( 08 Aug 2017 05:39 )             38.5         139  |  98  |  25<H>  ----------------------------<  91  3.8   |  26  |  1.50<H>    Ca    9.0      08 Aug 2017 05:39  Phos  3.2       Mg     2.1         TPro  6.2  /  Alb  3.3  /  TBili  0.4  /  DBili  x   /  AST  12  /  ALT  6<L>  /  AlkPhos  54      Magnesium, Serum: 2.1 mg/dL ( @ 05:39)  Phosphorus Level, Serum: 3.2 mg/dL ( @ 05:39)    Assessment and Plan:   Patient is an 87 y/o male history of HTN, Cataract, Glaucoma, melanoma dx >20 years ago s/p multiple resections, Renal CA w/ hematuria, transferred from OSH for multiple episodes of syncope with recorded ventricular asystole x2 lasting 8-10 seconds, s/p transvenous dual chamber pacemaker placement on 17, remains hemodynamically stable.     Ventricular Asystole, s/p PPM    - Continue telemetry monitoring     Non-displaced distal fibula fracture  - Ortho was consulted, placed cast, recs appreciated  - Non-weight bearing, ?rollator for ambulation  - OP Ortho f/u in 1 week after discharge  - Pending dispo to Rehab   - Patient should be discharged with DVT prophylaxis and remain on it until become mobile as per Ortho     Hypertension, uncontrolled   - Continue with Losartan 100 mg qd (pt's home medication) and HCTZ 37.5 mg qd. Will increase Carvedilol dose to 12.5 mg bid      DVT prophylaxis   - Heparin subQ

## 2017-08-08 NOTE — DISCHARGE NOTE ADULT - HOSPITAL COURSE
Patient was admitted to the CCU unit after multiple episodes of ventricular asystole. He had an uncomplicated transvenous pacemaker placement on 8/6/17. Patient tolerated the procedure well. Patient and family was educated on pacemaker related restrictions. He remained asymptomatic in the unit after the procedure. In the mean time, he was seen by Orthopedics for non-displaced left distal fibula fracture and had left lower cast placed. Recommendation was giving for non-weight bearing. Rehab was consulted for impaired mobility and recommended AIDAN.   Of note, patient had episodes of gross hematuria, therefore his Urologist Dr. Holland Yan was contacted and recommended no acute intervention giving the fact that patient has long history of hematuria secondary to advanced stage RCC on right side and refused any procedure (i.e renal embolization). His CBC was monitored daily and H&H remained stable.   His blood pressure medications were adjusted during hospital course for elevated isolated systolic BP. His current medication includes Losartan 100 mg daily, HCTZ 37.5 mg daily and Carvedilol 12.5 mg BID.   Patient has been accepted from [facility] and will be discharged with DVT prophylaxis, HSQ, until becoming mobile as per Ortho recommendation.

## 2017-08-08 NOTE — DISCHARGE NOTE ADULT - MEDICATION SUMMARY - MEDICATIONS TO CHANGE
I will SWITCH the dose or number of times a day I take the medications listed below when I get home from the hospital:    hydroCHLOROthiazide 25 mg oral tablet  -- 1 tab(s) by mouth once a day

## 2017-08-08 NOTE — DISCHARGE NOTE ADULT - MEDICATION SUMMARY - MEDICATIONS TO TAKE
I will START or STAY ON the medications listed below when I get home from the hospital:    losartan 100 mg oral tablet  -- 1 tab(s) by mouth once a day  -- Indication: For Hypertension    heparin  -- 5000 unit(s) subcutaneous every 12 hours  -- Indication: For DVT Prophylaxis    carvedilol 12.5 mg oral tablet  -- 1 tab(s) by mouth every 12 hours  -- Indication: For Hypertension    mupirocin 2% topical ointment  -- 1 application on skin once a day  -- Indication: For Topical Management    hydroCHLOROthiazide 12.5 mg oral tablet  -- 3 tab(s) by mouth once a day  -- Indication: For Hypertension

## 2017-08-08 NOTE — DISCHARGE NOTE ADULT - PLAN OF CARE
Follow up with Cardiologist You presented to the hospital after having multiple episodes of syncope and 8-10 second episodes of asystole. You have a permanent pacemaker placed without complications. Please follow up with a cardiologist once you have been discharged from rehab. You have a wound check appointment scheduled for  8/16/17 at 8:35am in the EP clinic at [facility], 1st floor. Continue antihypertensives You have a history of high blood pressure. The dosages of your antihypertensive medications were increased during your hospital stay. Please continue to take these medications and follow up with your primary care doctor within 1-2 weeks after discharge. Follow up with Orthopedics You have a fibula fracture. You were seen by orthopedics and had a cast placed. Please follow up with Dr. Darren Del Rio 1 week after discharge. Please do not bear weight on your leg. Follow up with your Urologist You have a history of renal cancer. Please follow up with your Urologist after discharge.

## 2017-08-08 NOTE — DISCHARGE NOTE ADULT - CARE PROVIDER_API CALL
Darren Del Rio (MD), Orthopaedic Surgery  [address] [place] [address]  Phone: [phone]  Fax: [phone]

## 2017-08-08 NOTE — DISCHARGE NOTE ADULT - CARE PLAN
Principal Discharge DX:	Ventricular asystolia  Goal:	Follow up with Cardiologist  Instructions for follow-up, activity and diet:	You presented to the hospital after having multiple episodes of syncope and 8-10 second episodes of asystole. You have a permanent pacemaker placed without complications. Please follow up with a cardiologist once you have been discharged from rehab. You have a wound check appointment scheduled for  8/16/17 at 8:35am in the EP clinic at [facility], 1st floor.  Secondary Diagnosis:	Hypertension  Goal:	Continue antihypertensives  Instructions for follow-up, activity and diet:	You have a history of high blood pressure. The dosages of your antihypertensive medications were increased during your hospital stay. Please continue to take these medications and follow up with your primary care doctor within 1-2 weeks after discharge.  Secondary Diagnosis:	Fracture tibia/fibula  Goal:	Follow up with Orthopedics  Instructions for follow-up, activity and diet:	You have a fibula fracture. You were seen by orthopedics and had a cast placed. Please follow up with Dr. Darren Del Rio 1 week after discharge. Please do not bear weight on your leg.  Secondary Diagnosis:	Renal cancer, right  Goal:	Follow up with your Urologist  Instructions for follow-up, activity and diet:	You have a history of renal cancer. Please follow up with your Urologist after discharge. Principal Discharge DX:	Ventricular asystolia  Goal:	Follow up with Cardiologist  Instructions for follow-up, activity and diet:	You presented to the hospital after having multiple episodes of syncope and 8-10 second episodes of asystole. You have a permanent pacemaker placed without complications. Please follow up with a cardiologist once you have been discharged from rehab. You have a wound check appointment scheduled for  8/16/17 at 8:35am in the EP clinic at [facility], 1st floor.  Secondary Diagnosis:	Hypertension  Goal:	Continue antihypertensives  Instructions for follow-up, activity and diet:	You have a history of high blood pressure. The dosages of your antihypertensive medications were increased during your hospital stay. Please continue to take these medications and follow up with your primary care doctor within 1-2 weeks after discharge.  Secondary Diagnosis:	Fracture tibia/fibula  Goal:	Follow up with Orthopedics  Instructions for follow-up, activity and diet:	You have a fibula fracture. You were seen by orthopedics and had a cast placed. Please follow up with Dr. Darren Del Rio 1 week after discharge. Please do not bear weight on your leg.  Secondary Diagnosis:	Renal cancer, right  Goal:	Follow up with your Urologist  Instructions for follow-up, activity and diet:	You have a history of renal cancer. Please follow up with your Urologist after discharge.

## 2017-08-16 ENCOUNTER — APPOINTMENT (OUTPATIENT)
Dept: ELECTROPHYSIOLOGY | Facility: CLINIC | Age: 82
End: 2017-08-16
Payer: MEDICARE

## 2017-08-16 ENCOUNTER — APPOINTMENT (OUTPATIENT)
Dept: ORTHOPEDIC SURGERY | Facility: CLINIC | Age: 82
End: 2017-08-16
Payer: MEDICARE

## 2017-08-16 ENCOUNTER — NON-APPOINTMENT (OUTPATIENT)
Age: 82
End: 2017-08-16

## 2017-08-16 VITALS — WEIGHT: 192 LBS | HEIGHT: 71 IN | BODY MASS INDEX: 26.88 KG/M2

## 2017-08-16 VITALS
HEIGHT: 71 IN | DIASTOLIC BLOOD PRESSURE: 76 MMHG | SYSTOLIC BLOOD PRESSURE: 165 MMHG | OXYGEN SATURATION: 98 % | WEIGHT: 192 LBS | BODY MASS INDEX: 26.88 KG/M2 | HEART RATE: 60 BPM

## 2017-08-16 DIAGNOSIS — I44.2 ATRIOVENTRICULAR BLOCK, COMPLETE: ICD-10-CM

## 2017-08-16 PROCEDURE — 99024 POSTOP FOLLOW-UP VISIT: CPT

## 2017-08-16 PROCEDURE — 73610 X-RAY EXAM OF ANKLE: CPT | Mod: LT

## 2017-08-16 RX ORDER — CARVEDILOL 12.5 MG/1
12.5 TABLET, FILM COATED ORAL TWICE DAILY
Qty: 60 | Refills: 1 | Status: ACTIVE | COMMUNITY
Start: 2017-08-16

## 2017-08-16 RX ORDER — HYDROCHLOROTHIAZIDE 12.5 MG/1
12.5 TABLET ORAL
Refills: 0 | Status: ACTIVE | COMMUNITY

## 2017-08-18 RX ORDER — HEPARIN SODIUM 5000 [USP'U]/100ML
INJECTION, SOLUTION INTRAVENOUS
Refills: 0 | Status: ACTIVE | COMMUNITY

## 2017-08-30 ENCOUNTER — APPOINTMENT (OUTPATIENT)
Dept: ORTHOPEDIC SURGERY | Facility: CLINIC | Age: 82
End: 2017-08-30

## 2017-09-06 ENCOUNTER — APPOINTMENT (OUTPATIENT)
Dept: ORTHOPEDIC SURGERY | Facility: CLINIC | Age: 82
End: 2017-09-06
Payer: MEDICARE

## 2017-09-06 DIAGNOSIS — S82.65XD NONDISPLACED FRACTURE OF LATERAL MALLEOLUS OF LEFT FIBULA, SUBSEQUENT ENCOUNTER FOR CLOSED FRACTURE WITH ROUTINE HEALING: ICD-10-CM

## 2017-09-06 PROCEDURE — 99024 POSTOP FOLLOW-UP VISIT: CPT

## 2017-09-06 PROCEDURE — 73610 X-RAY EXAM OF ANKLE: CPT | Mod: LT

## 2017-09-08 ENCOUNTER — APPOINTMENT (OUTPATIENT)
Dept: CARDIOLOGY | Facility: CLINIC | Age: 82
End: 2017-09-08

## 2017-11-16 ENCOUNTER — APPOINTMENT (OUTPATIENT)
Dept: ELECTROPHYSIOLOGY | Facility: CLINIC | Age: 82
End: 2017-11-16

## 2022-11-09 NOTE — ED PROVIDER NOTE - NS ED MD EM SELECTION
General Sunscreen Counseling: I recommended a broad spectrum sunscreen with a SPF of 30 or higher.  I explained that SPF 30 sunscreens block approximately 97 percent of the sun's harmful rays.  Sunscreens should be applied at least 15 minutes prior to expected sun exposure and then every 2 hours after that as long as sun exposure continues. If swimming or exercising sunscreen should be reapplied every 45 minutes to an hour after getting wet or sweating.  One ounce, or the equivalent of a shot glass full of sunscreen, is adequate to protect the skin not covered by a bathing suit. I also recommended a lip balm with a sunscreen as well. Sun protective clothing can be used in lieu of sunscreen but must be worn the entire time you are exposed to the sun's rays. Products Recommended: He is specifically asking about Blue light treatment and he was given the na,e and address of Dr. Susannah Bueno. Detail Level: Detailed [address] Comprehensive

## 2022-11-18 NOTE — PATIENT PROFILE ADULT. - NS SC CAGE ALCOHOL EYE OPENER
From: Vinicius Weaver  To: Lang Stockton MD  Sent: 11/17/2022 10:28 PM CST  Subject: Referral for Dr Stover Erps    I am being told I need a referral in order to see the endocrinologist for my annual visit. no

## 2023-07-27 NOTE — PATIENT PROFILE ADULT. - CURRENT SWALLOWING
Quality 130: Documentation Of Current Medications In The Medical Record: Current Medications Documented Detail Level: Detailed Quality 226: Preventive Care And Screening: Tobacco Use: Screening And Cessation Intervention: Patient screened for tobacco use and is an ex/non-smoker (0) swallows foods and liquids w/o difficulty

## 2023-12-28 NOTE — ED ADULT NURSE NOTE - AGGRAVATING FACTORS
none Strong peripheral pulses/Capillary refill less/equal to 2 seconds/No visible significant external bleeding

## 2024-04-29 NOTE — ED ADULT NURSE NOTE - PMH
normal for race Hypertension    Macular degeneration, right eye    Melanoma in situ of lower leg, right    Melanoma in situ of right upper arm    Renal cancer, right